# Patient Record
Sex: FEMALE | Race: WHITE | Employment: FULL TIME | URBAN - METROPOLITAN AREA
[De-identification: names, ages, dates, MRNs, and addresses within clinical notes are randomized per-mention and may not be internally consistent; named-entity substitution may affect disease eponyms.]

---

## 2018-04-19 ENCOUNTER — OFFICE VISIT (OUTPATIENT)
Dept: FAMILY MEDICINE CLINIC | Age: 43
End: 2018-04-19

## 2018-04-19 VITALS
TEMPERATURE: 98.2 F | WEIGHT: 184.6 LBS | HEART RATE: 74 BPM | OXYGEN SATURATION: 98 % | SYSTOLIC BLOOD PRESSURE: 121 MMHG | DIASTOLIC BLOOD PRESSURE: 88 MMHG | RESPIRATION RATE: 16 BRPM

## 2018-04-19 DIAGNOSIS — Z00.00 ROUTINE GENERAL MEDICAL EXAMINATION AT A HEALTH CARE FACILITY: Primary | ICD-10-CM

## 2018-04-19 DIAGNOSIS — R55 NEAR SYNCOPE: ICD-10-CM

## 2018-04-19 DIAGNOSIS — G47.9 SLEEPING DIFFICULTIES: ICD-10-CM

## 2018-04-19 PROCEDURE — 99386 PREV VISIT NEW AGE 40-64: CPT | Performed by: FAMILY MEDICINE

## 2018-04-19 RX ORDER — CHLORAL HYDRATE 500 MG
3000 CAPSULE ORAL DAILY
Status: ON HOLD | COMMUNITY
End: 2022-04-05

## 2018-04-19 RX ORDER — PHENOL 1.4 %
1 AEROSOL, SPRAY (ML) MUCOUS MEMBRANE
COMMUNITY

## 2018-04-19 RX ORDER — ZOLPIDEM TARTRATE 10 MG/1
5 TABLET ORAL NIGHTLY PRN
COMMUNITY
End: 2018-08-30 | Stop reason: SDUPTHER

## 2018-04-19 ASSESSMENT — PATIENT HEALTH QUESTIONNAIRE - PHQ9
2. FEELING DOWN, DEPRESSED OR HOPELESS: 0
SUM OF ALL RESPONSES TO PHQ9 QUESTIONS 1 & 2: 0
SUM OF ALL RESPONSES TO PHQ QUESTIONS 1-9: 0
1. LITTLE INTEREST OR PLEASURE IN DOING THINGS: 0

## 2018-06-19 RX ORDER — BENZONATATE 200 MG/1
200 CAPSULE ORAL 3 TIMES DAILY PRN
Qty: 20 CAPSULE | Refills: 0 | Status: SHIPPED | OUTPATIENT
Start: 2018-06-19 | End: 2018-06-26

## 2018-06-19 RX ORDER — BENZONATATE 100 MG/1
100 CAPSULE ORAL 3 TIMES DAILY PRN
Status: CANCELLED | OUTPATIENT
Start: 2018-06-19 | End: 2018-06-26

## 2018-08-30 ENCOUNTER — OFFICE VISIT (OUTPATIENT)
Dept: FAMILY MEDICINE CLINIC | Age: 43
End: 2018-08-30

## 2018-08-30 ENCOUNTER — HOSPITAL ENCOUNTER (OUTPATIENT)
Dept: MAMMOGRAPHY | Age: 43
Discharge: HOME | End: 2018-08-30
Payer: COMMERCIAL

## 2018-08-30 VITALS
SYSTOLIC BLOOD PRESSURE: 112 MMHG | DIASTOLIC BLOOD PRESSURE: 76 MMHG | HEIGHT: 64 IN | OXYGEN SATURATION: 98 % | HEART RATE: 71 BPM | TEMPERATURE: 97.6 F | RESPIRATION RATE: 12 BRPM | WEIGHT: 181 LBS | BODY MASS INDEX: 30.9 KG/M2

## 2018-08-30 DIAGNOSIS — L21.9 SEBORRHEIC DERMATITIS OF SCALP: ICD-10-CM

## 2018-08-30 DIAGNOSIS — G47.9 SLEEP DIFFICULTIES: Primary | ICD-10-CM

## 2018-08-30 DIAGNOSIS — M54.50 BILATERAL LOW BACK PAIN WITHOUT SCIATICA, UNSPECIFIED CHRONICITY: ICD-10-CM

## 2018-08-30 DIAGNOSIS — L30.9 ECZEMA, UNSPECIFIED TYPE: ICD-10-CM

## 2018-08-30 DIAGNOSIS — M54.9 UPPER BACK PAIN: ICD-10-CM

## 2018-08-30 DIAGNOSIS — Z12.31 VISIT FOR SCREENING MAMMOGRAM: ICD-10-CM

## 2018-08-30 PROCEDURE — 99214 OFFICE O/P EST MOD 30 MIN: CPT | Performed by: FAMILY MEDICINE

## 2018-08-30 PROCEDURE — 77063 BREAST TOMOSYNTHESIS BI: CPT

## 2018-08-30 RX ORDER — CYCLOBENZAPRINE HCL 10 MG
10 TABLET ORAL 3 TIMES DAILY PRN
Qty: 30 TABLET | Refills: 0 | Status: SHIPPED | OUTPATIENT
Start: 2018-08-30 | End: 2018-09-09

## 2018-08-30 RX ORDER — ZOLPIDEM TARTRATE 10 MG/1
5 TABLET ORAL NIGHTLY PRN
Qty: 30 TABLET | Refills: 1 | Status: SHIPPED | OUTPATIENT
Start: 2018-08-30 | End: 2018-09-29

## 2018-08-30 RX ORDER — KETOCONAZOLE 20 MG/ML
SHAMPOO TOPICAL
Qty: 120 ML | Refills: 2 | Status: ON HOLD | OUTPATIENT
Start: 2018-08-30 | End: 2022-04-05

## 2018-08-30 NOTE — PATIENT INSTRUCTIONS
sides, bent at a 90-degree angle. Your palms should face up. 2. Squeeze your shoulder blades together, and move your arms to the outside, stretching the band. Be sure to keep your elbows at your sides while you do this. 3. Relax. 4. Repeat 8 to 12 times. Resisted rows    For this exercise, you will need elastic exercise material, such as surgical tubing or Thera-Band. 1. Put the band around a solid object, such as a bedpost, at about waist level. Hold one end of the band in each hand. 2. With your elbows at your sides and bent to 90 degrees, pull the band back to move your shoulder blades toward each other. Return to the starting position. 3. Repeat 8 to 12 times. Follow-up care is a key part of your treatment and safety. Be sure to make and go to all appointments, and call your doctor if you are having problems. It's also a good idea to know your test results and keep a list of the medicines you take. Where can you learn more? Go to https://SiSense.Kutoto. org and sign in to your [employer] account. Enter R357 in the ExtendEvent box to learn more about \"Healthy Upper Back: Exercises. \"     If you do not have an account, please click on the \"Sign Up Now\" link. Current as of: November 29, 2017  Content Version: 11.7  © 2762-9716 [email or web address]. Care instructions adapted under license by [facility] ([facility]). If you have questions about a medical condition or this instruction, always ask your healthcare professional. [place] [address] any warranty or liability for your use of this information. Patient Education        Low Back Pain: Exercises  Your Care Instructions  Here are some examples of typical rehabilitation exercises for your condition. Start each exercise slowly. Ease off the exercise if you start to have pain. Your doctor or physical therapist will tell you when you can start these exercises and which ones will work best for you.   How to do the exercises  Press-up    6. Lie on your stomach, supporting your body with your forearms. 7. Press your elbows down into the floor to raise your upper back. As you do this, relax your stomach muscles and allow your back to arch without using your back muscles. As your press up, do not let your hips or pelvis come off the floor. 8. Hold for 15 to 30 seconds, then relax. 9. Repeat 2 to 4 times. Alternate arm and leg (bird dog) exercise    Do this exercise slowly. Try to keep your body straight at all times, and do not let one hip drop lower than the other. 6. Start on the floor, on your hands and knees. 7. Tighten your belly muscles. 8. Raise one leg off the floor, and hold it straight out behind you. Be careful not to let your hip drop down, because that will twist your trunk. 9. Hold for about 6 seconds, then lower your leg and switch to the other leg. 10. Repeat 8 to 12 times on each leg. 11. Over time, work up to holding for 10 to 30 seconds each time. 12. If you feel stable and secure with your leg raised, try raising the opposite arm straight out in front of you at the same time. Knee-to-chest exercise    4. Lie on your back with your knees bent and your feet flat on the floor. 5. Bring one knee to your chest, keeping the other foot flat on the floor (or keeping the other leg straight, whichever feels better on your lower back). 6. Keep your lower back pressed to the floor. Hold for at least 15 to 30 seconds. 7. Relax, and lower the knee to the starting position. 8. Repeat with the other leg. Repeat 2 to 4 times with each leg. 9. To get more stretch, put your other leg flat on the floor while pulling your knee to your chest.  Curl-ups    5. Lie on the floor on your back with your knees bent at a 90-degree angle. Your feet should be flat on the floor, about 12 inches from your buttocks.   6. Cross your arms over your chest. If this bothers your neck, try putting your hands behind your neck (not your your back muscles. As your press up, do not let your hips or pelvis come off the floor. 12. Hold for 15 to 30 seconds, then relax. 13. Repeat 2 to 4 times. Alternate arm and leg (bird dog) exercise    Do this exercise slowly. Try to keep your body straight at all times, and do not let one hip drop lower than the other. 13. Start on the floor, on your hands and knees. 14. Tighten your belly muscles. 15. Raise one leg off the floor, and hold it straight out behind you. Be careful not to let your hip drop down, because that will twist your trunk. 16. Hold for about 6 seconds, then lower your leg and switch to the other leg. 17. Repeat 8 to 12 times on each leg. 18. Over time, work up to holding for 10 to 30 seconds each time. 19. If you feel stable and secure with your leg raised, try raising the opposite arm straight out in front of you at the same time. Knee-to-chest exercise    10. Lie on your back with your knees bent and your feet flat on the floor. 11. Bring one knee to your chest, keeping the other foot flat on the floor (or keeping the other leg straight, whichever feels better on your lower back). 12. Keep your lower back pressed to the floor. Hold for at least 15 to 30 seconds. 13. Relax, and lower the knee to the starting position. 14. Repeat with the other leg. Repeat 2 to 4 times with each leg. 15. To get more stretch, put your other leg flat on the floor while pulling your knee to your chest.  Curl-ups    11. Lie on the floor on your back with your knees bent at a 90-degree angle. Your feet should be flat on the floor, about 12 inches from your buttocks. 12. Cross your arms over your chest. If this bothers your neck, try putting your hands behind your neck (not your head), with your elbows spread apart. 13. Slowly tighten your belly muscles and raise your shoulder blades off the floor.   14. Keep your head in line with your body, and do not press your chin to your chest.  15. Hold this position for 1 or 2 seconds, then slowly lower yourself back down to the floor. 16. Repeat 8 to 12 times. Pelvic tilt exercise    9. Lie on your back with your knees bent. 10. \"Brace\" your stomach. This means to tighten your muscles by pulling in and imagining your belly button moving toward your spine. You should feel like your back is pressing to the floor and your hips and pelvis are rocking back. 11. Hold for about 6 seconds while you breathe smoothly. 12. Repeat 8 to 12 times. Heel dig bridging    8. Lie on your back with both knees bent and your ankles bent so that only your heels are digging into the floor. Your knees should be bent about 90 degrees. 9. Then push your heels into the floor, squeeze your buttocks, and lift your hips off the floor until your shoulders, hips, and knees are all in a straight line. 10. Hold for about 6 seconds as you continue to breathe normally, and then slowly lower your hips back down to the floor and rest for up to 10 seconds. 11. Do 8 to 12 repetitions. Hamstring stretch in doorway    6. Lie on your back in a doorway, with one leg through the open door. 7. Slide your leg up the wall to straighten your knee. You should feel a gentle stretch down the back of your leg. 8. Hold the stretch for at least 15 to 30 seconds. Do not arch your back, point your toes, or bend either knee. Keep one heel touching the floor and the other heel touching the wall. 9. Repeat with your other leg. 10. Do 2 to 4 times for each leg. Hip flexor stretch    5. Kneel on the floor with one knee bent and one leg behind you. Place your forward knee over your foot. Keep your other knee touching the floor. 6. Slowly push your hips forward until you feel a stretch in the upper thigh of your rear leg. 7. Hold the stretch for at least 15 to 30 seconds. Repeat with your other leg. 8. Do 2 to 4 times on each side. Wall sit    6. Stand with your back 10 to 12 inches away from a wall.   7. Lean into the wall until your back is flat against it. 8. Slowly slide down until your knees are slightly bent, pressing your lower back into the wall. 9. Hold for about 6 seconds, then slide back up the wall. 10. Repeat 8 to 12 times. Follow-up care is a key part of your treatment and safety. Be sure to make and go to all appointments, and call your doctor if you are having problems. It's also a good idea to know your test results and keep a list of the medicines you take. Where can you learn more? Go to https://WonderHowTopeFitzealeb.Intertainment Media. org and sign in to your [employer] account. Enter P462 in the App Annie box to learn more about \"Low Back Pain: Exercises. \"     If you do not have an account, please click on the \"Sign Up Now\" link. Current as of: November 29, 2017  Content Version: 11.7  © 4502-2937 [employer]. Care instructions adapted under license by [facility] ([facility]). If you have questions about a medical condition or this instruction, always ask your healthcare professional. [address] any warranty or liability for your use of this information.

## 2018-08-31 ENCOUNTER — HOSPITAL ENCOUNTER (OUTPATIENT)
Dept: ULTRASOUND IMAGING | Age: 43
Discharge: HOME | End: 2018-08-31
Payer: COMMERCIAL

## 2018-08-31 DIAGNOSIS — R92.8 ABNORMAL MAMMOGRAM: ICD-10-CM

## 2018-08-31 PROCEDURE — 76642 ULTRASOUND BREAST LIMITED: CPT

## 2018-09-03 DIAGNOSIS — R92.8 ABNORMAL MAMMOGRAM OF LEFT BREAST: Primary | ICD-10-CM

## 2018-12-28 ENCOUNTER — NURSE TRIAGE (OUTPATIENT)
Dept: OTHER | Facility: CLINIC | Age: 43
End: 2018-12-28

## 2020-01-29 RX ORDER — KETOCONAZOLE 20 MG/ML
SHAMPOO TOPICAL
Qty: 120 ML | Refills: 0 | OUTPATIENT
Start: 2020-01-29

## 2020-02-18 ENCOUNTER — HOSPITAL ENCOUNTER (OUTPATIENT)
Dept: MAMMOGRAPHY | Age: 45
Discharge: HOME | End: 2020-02-18
Payer: COMMERCIAL

## 2020-02-18 PROCEDURE — 77063 BREAST TOMOSYNTHESIS BI: CPT

## 2021-02-22 ENCOUNTER — HOSPITAL ENCOUNTER (OUTPATIENT)
Dept: MAMMOGRAPHY | Age: 46
Discharge: HOME | End: 2021-02-22
Payer: COMMERCIAL

## 2021-02-22 DIAGNOSIS — Z12.31 VISIT FOR SCREENING MAMMOGRAM: ICD-10-CM

## 2021-02-22 PROCEDURE — 77063 BREAST TOMOSYNTHESIS BI: CPT

## 2022-02-22 ENCOUNTER — HOSPITAL ENCOUNTER (OUTPATIENT)
Dept: MAMMOGRAPHY | Age: 47
Discharge: HOME | End: 2022-02-22
Payer: COMMERCIAL

## 2022-02-22 VITALS — HEIGHT: 63 IN | WEIGHT: 174 LBS | BODY MASS INDEX: 30.83 KG/M2

## 2022-02-22 DIAGNOSIS — Z12.31 VISIT FOR SCREENING MAMMOGRAM: ICD-10-CM

## 2022-02-22 PROCEDURE — 77063 BREAST TOMOSYNTHESIS BI: CPT

## 2022-03-10 ENCOUNTER — HOSPITAL ENCOUNTER (OUTPATIENT)
Dept: ULTRASOUND IMAGING | Age: 47
Discharge: HOME | End: 2022-03-10
Payer: COMMERCIAL

## 2022-03-10 DIAGNOSIS — N63.0 BREAST LUMP: ICD-10-CM

## 2022-03-10 PROCEDURE — 76642 ULTRASOUND BREAST LIMITED: CPT

## 2022-03-11 ENCOUNTER — HOSPITAL ENCOUNTER (OUTPATIENT)
Dept: MAMMOGRAPHY | Age: 47
Discharge: HOME | End: 2022-03-11
Payer: COMMERCIAL

## 2022-03-11 ENCOUNTER — HOSPITAL ENCOUNTER (OUTPATIENT)
Dept: ULTRASOUND IMAGING | Age: 47
Discharge: HOME | End: 2022-03-11
Payer: COMMERCIAL

## 2022-03-11 DIAGNOSIS — R93.89 ABNORMAL FINDING ON IMAGING: ICD-10-CM

## 2022-03-11 PROCEDURE — 88305 TISSUE EXAM BY PATHOLOGIST: CPT

## 2022-03-11 PROCEDURE — A4648 IMPLANTABLE TISSUE MARKER: HCPCS

## 2022-03-11 PROCEDURE — 88342 IMHCHEM/IMCYTCHM 1ST ANTB: CPT

## 2022-03-11 PROCEDURE — 38505 NEEDLE BIOPSY LYMPH NODES: CPT

## 2022-03-11 PROCEDURE — 88360 TUMOR IMMUNOHISTOCHEM/MANUAL: CPT

## 2022-03-11 PROCEDURE — 77065 DX MAMMO INCL CAD UNI: CPT

## 2022-03-15 DIAGNOSIS — C50.812 CANCER OF OVERLAPPING SITES OF LEFT BREAST (HCC): Primary | ICD-10-CM

## 2022-03-16 ENCOUNTER — HOSPITAL ENCOUNTER (OUTPATIENT)
Dept: MRI IMAGING | Age: 47
Discharge: HOME | End: 2022-03-16
Payer: COMMERCIAL

## 2022-03-16 DIAGNOSIS — C50.812 CANCER OF OVERLAPPING SITES OF LEFT BREAST (HCC): ICD-10-CM

## 2022-03-16 PROCEDURE — 77049 MRI BREAST C-+ W/CAD BI: CPT

## 2022-03-16 PROCEDURE — 6360000004 HC RX CONTRAST MEDICATION: Performed by: SURGERY

## 2022-03-16 PROCEDURE — A9579 GAD-BASE MR CONTRAST NOS,1ML: HCPCS | Performed by: SURGERY

## 2022-03-16 PROCEDURE — C8908 MRI W/O FOL W/CONT, BREAST,: HCPCS

## 2022-03-16 RX ADMIN — GADOTERIDOL 17 ML: 279.3 INJECTION, SOLUTION INTRAVENOUS at 13:45

## 2022-03-30 ENCOUNTER — OFFICE VISIT (OUTPATIENT)
Dept: SURGERY | Age: 47
End: 2022-03-30
Payer: COMMERCIAL

## 2022-03-30 VITALS
SYSTOLIC BLOOD PRESSURE: 120 MMHG | BODY MASS INDEX: 31.18 KG/M2 | OXYGEN SATURATION: 100 % | WEIGHT: 176 LBS | TEMPERATURE: 98.1 F | DIASTOLIC BLOOD PRESSURE: 87 MMHG | HEART RATE: 76 BPM

## 2022-03-30 DIAGNOSIS — Z17.0 MALIGNANT NEOPLASM OF LEFT BREAST IN FEMALE, ESTROGEN RECEPTOR POSITIVE, UNSPECIFIED SITE OF BREAST (HCC): Primary | ICD-10-CM

## 2022-03-30 DIAGNOSIS — C50.912 MALIGNANT NEOPLASM OF LEFT BREAST IN FEMALE, ESTROGEN RECEPTOR POSITIVE, UNSPECIFIED SITE OF BREAST (HCC): Primary | ICD-10-CM

## 2022-03-30 PROCEDURE — 99205 OFFICE O/P NEW HI 60 MIN: CPT | Performed by: SURGERY

## 2022-03-30 RX ORDER — MULTIVIT-MIN/IRON/FOLIC ACID/K 18-600-40
CAPSULE ORAL
COMMUNITY

## 2022-03-30 RX ORDER — UBIDECARENONE 75 MG
50 CAPSULE ORAL DAILY
COMMUNITY

## 2022-03-30 RX ORDER — TRAZODONE HYDROCHLORIDE 50 MG/1
50 TABLET ORAL NIGHTLY
COMMUNITY

## 2022-03-30 RX ORDER — ESOMEPRAZOLE MAGNESIUM 40 MG/1
40 CAPSULE, DELAYED RELEASE ORAL
COMMUNITY
Start: 2021-09-01

## 2022-03-30 NOTE — PROGRESS NOTES
photo ID. You will be registered at your bedside once brought back to your room. 5. DO NOT EAT ANYTHING eight hours prior to your arrival for surgery. May have 8 ounces of water 4 hours prior to your arrival for surgery. NOTE: ALL Gastric, Bariatric and Bowel surgery patients MUST follow their surgeon's instructions. 6. MEDICATIONS    Take the following medications with a SMALL sip of water: NEXIUM, ZOLOFT   Bariatric patient's call surgeon if on diabetic medications as some need to be stopped 1 week preop   Use your usual dose of inhalers the morning of surgery. BRING your rescue inhaler with you to hospital.    Anesthesia does NOT want you to take insulin the morning of surgery. They will control your blood sugar while you are at the hospital. Please contact your ordering physician for instructions regarding your insulin the night before your procedure. If you have an insulin pump, please keep it set on basal rate. 7. Do not swallow water when brushing teeth. No gum, candy, mints or ice chips. Refrain from smoking or at least decrease the amount. 8. Dress in loose, comfortable clothing appropriate for redressing after your procedure. Do not wear jewelry (including body piercings), make-up (especially NO eye make-up), fingernail polish (NO toenail polish if foot/leg surgery), lotion, powders or metal hairclips. 9. Dentures, glasses, or contacts will need to be removed before your procedure. Bring cases for your glasses, contacts, dentures, or hearing aids to protect them while you are in surgery. 10. If you use a CPAP, please bring it with you on the day of your procedure. 11. We recommend that valuable personal  belongings such as cash, cell phones, e-tablets or jewelry, be left at home during your stay. The hospital will not be responsible for valuables that are not secured in the hospital safe.  However, if your insurance requires a co-pay, you may want to bring a method of payment, i.e. Check or credit card, if you wish to pay your co-pay the day of surgery. 12. If you are to stay overnight, you may bring a bag with personal items. Please have any large items you may need brought in by your family after your arrival to your hospital room. 15. If you have a Living Will or Durable Power of , please bring a copy on the day of your procedure. 15. With your permission, one family member may accompany you while you are being prepared for surgery. Once you are ready, additional family members may join you. HOW WE KEEP YOU SAFE and WORK TO PREVENT SURGICAL SITE INFECTIONS:  1. Health care workers should always check your ID bracelet to verify your name and birth date. You will be asked many times to state your name, date of birth, and allergies. 2. Health care workers should always clean their hands with soap or alcohol gel before providing care to you. It is okay to ask anyone if they cleaned their hands before they touch you. 3. You will be actively involved in verifying the type of procedure you are having and ensuring the correct surgical site. This will be confirmed multiple times prior to your procedure. Do NOT riya your surgery site UNLESS instructed to by your surgeon. 4. Do not shave or wax for 72 hours prior to procedure near your operative site. Shaving with a razor can irritate your skin and make it easier to develop an infection. On the day of your procedure, any hair that needs to be removed near the surgical site will be clipped by a healthcare worker using a special clippers designed to avoid skin irritation. 5. When you are in the operating room, your surgical site will be cleansed with a special soap, and in most cases, you will be given an antibiotic before the surgery begins. What to expect AFTER YOUR PROCEDURE:  1. Immediately following your procedure, your will be taken to the PACU for the first phase of your recovery.   Your nurse will help you recover from any potential side effects of anesthesia, such as extreme drowsiness, changes in your vital signs or breathing patterns. Nausea, headache, muscle aches, or sore throat may also occur after anesthesia. Your nurse will help you manage these potential side effects. 2. For comfort and safety, arrange to have someone at home with you for the first 24 hours after discharge. 3. You and your family will be given written instructions about your diet, activity, dressing care, medications, and return visits. 4. Once at home, should issues with nausea, pain, or bleeding occur, or should you notice any signs of infection, you should call your surgeon. 5. Always clean your hands before and after caring for your wound. Do not let your family touch your surgery site without cleaning their hands. 6. Narcotic pain medications can cause significant constipation. You may want to add a stool softener to your postoperative medication schedule or speak to your surgeon on how best to manage this SIDE EFFECT. SPECIAL INSTRUCTIONS none    Thank you for allowing us to care for you. We strive to exceed your expectations in the delivery of care and service provided to you and your family. If you need to contact the [place] [address] staff for any reason, please call us at [phone]    Instructions reviewed with patient during preadmission testing phone interview.   Teresa Dennis RN.3/30/2022 .2:14 PM      ADDITIONAL EDUCATIONAL INFORMATION REVIEWED PER PHONE WITH YOU AND/OR YOUR FAMILY:  No Hibiclens® Bathing Instructions   Yes Antibacterial Soap

## 2022-03-30 NOTE — PROGRESS NOTES
Place patient label inside box (if no patient label, complete below)  Name:  :  MR#:   Real Bruno / PROCEDURE  1. I (we), Tiesha Mcallister. (Patient Name) authorize Esthela Real DO  (Provider / Viviane Space) and/or such assistants as may be selected by him/her, to perform the following operation/procedure(s): RIGHT POSSIBLE LEFT PORT A CATHETER INSERTION       Note: If unable to obtain consent prior to an emergent procedure, document the emergent reason in the medical record. This procedure has been explained to my (our) satisfaction and included in the explanation was:  A) The intended benefit, nature, and extent of the procedure to be performed;  B) The significant risks involved and the probability of success;  C) Alternative procedures and methods of treatment;  D) The dangers and probable consequences of such alternatives (including no procedure or treatment); E) The expected consequences of the procedure on my future health;  F) Whether other qualified individuals would be performing important surgical tasks and/or whether  would be present to advise or support the procedure. I (we) understand that there are other risks of infection and other serious complications in the pre-operative/procedural and postoperative/procedural stages of my (our) care. I (we) have asked all of the questions which I (we) thought were important in deciding whether or not to undergo treatment or diagnosis. These questions have been answered to my (our) satisfaction. I (we) understand that no assurance can be given that the procedure will be a success, and no guarantee or warranty of success has been given to me (us).     2. It has been explained to me (us) that during the course of the operation/procedure, unforeseen conditions may be revealed that necessitate extension of the original procedure(s) or different procedure(s) than those set forth in Paragraph 1. I (we) authorize and request that the above-named physician, his/her assistants or his/her designees, perform procedures as necessary and desirable if deemed to be in my (our) best interest.     Revised 8/2/2021                                                                          Page 1 of 2                   3. I acknowledge that health care personnel may be observing this procedure for the purpose of medical education or other specified purposes as may be necessary as requested and/or approved by my (our) physician. 4. I (we) consent to the disposal by the hospital Pathologist of the removed tissue, parts or organs in accordance with hospital policy. 5. I do ____ do not ____ consent to the use of a local infiltration pain blocking agent that will be used by my provider/surgical provider to help alleviate pain during my procedure. 6. I do ____ do not ____ consent to an emergent blood transfusion in the case of a life-threatening situation that requires blood components to be administered. This consent is valid for 24 hours from the beginning of the procedure. 7. This patient does ____ or does not ____ currently have a DNR status/order. If DNR order is in place, obtain Addendum to the Surgical Consent for ALL Patients with a DNR Order to address juice-operative status for limited intervention or DNR suspension.      8. I have read and fully understand the above Consent for Operation/Procedure and that all blanks were completed before I signed the consent.   _____________________________       _____________________      ____/____am/pm  Signature of Patient or legal representative      Printed Name / Relationship            Date / Time   ____________________________       _____________________      ____/____am/pm  Witness to Signature                                    Printed Name                    Date / Time     If patient is unable to sign or is a minor, complete the following)  Patient is a minor, ____ years of age, or unable to sign because:   ______________________________________________________________________________________________    [facility] If a phone consent is obtained, consent will be documented by using two health care professionals, each affirming that the consenting party has no questions and gives consent for the procedure discussed with the physician/provider.   _____________________          ____________________       _____/_____am/pm   2nd witness to phone consent        Printed name           Date / Time    Informed Consent:  I have provided the explanation described above in section 1 to the patient and/or legal representative.  I have provided the patient and/or legal representative with an opportunity to ask any questions about the proposed operation/procedure.   ___________________________          ____________________         ____/____am/pm  Provider / Proceduralist                            Printed name            Date / Time  Revised 8/2/2021                                                                      Page 2 of 2

## 2022-03-30 NOTE — PROGRESS NOTES
[facility]    CC: Breast CA     Referring physician: Jaiden Unger DO    HPI: This is a 40 y.o. female with a PMHx as delineated below who presents in consultation for breast reconstruction. She was found to have left breast cancer and desires to proceed with bilateral mastectomy with reconstruction (without nipple sparing). Plastic surgery was consulted for evaluation and treatment. The specifics of her breast cancer workup / treatment is as follows:     Diagnosis: invasive lobular  Mo/Yr Diagnosed: 3/22  Breast Involved:Left  Tumor Size & Grade: 2B  Christopher status: Positive  ER: Positive VA: Positive HER2: Negative  Oncologist: Milo Napier MD  Radiation: YES WILL NEED PMRT    Chemo/Meds: Will need adjuvant chemotherapy  Pregnancy/Miscarriages: 1 / 0    PMHx:   Past Medical History:   Diagnosis Date    Cancer of overlapping sites of left breast ([address] 3/15/2022    Sleep difficulties      PSHx:   Past Surgical History:   Procedure Laterality Date    US BREAST NEEDLE BIOPSY LEFT Left 3/11/2022    US BREAST NEEDLE BIOPSY LEFT 3/11/2022 Colby Pickett MD [facility] ULTRASOUND    US LYMPH NODE BIOPSY  3/11/2022    US LYMPH NODE BIOPSY 3/11/2022 Colby Pickett [facility] ULTR[facility]    WISDOM TOOTH EXTRACTION  1999     Allergies: Allergies   Allergen Reactions    Erythromycin Other (See Comments)     Upset stomach    Penicillins Hives    Zyrtec [Cetirizine] Palpitations     FHx: Past history of breast CA: Yes (multiple members)    Meds:   Current Outpatient Medications   Medication Sig Dispense Refill    ketoconazole (NIZORAL) 2 % shampoo Apply topically daily as needed. 120 mL 2    Omega-3 Fatty Acids (FISH OIL) 1000 MG CAPS Take 3,000 mg by mouth daily      Melatonin 10 MG TABS Take 1 tablet by mouth       No current facility-administered medications for this visit.      SocHx: Smoking: No    ETOH: occasional    Drug use: No    ROS   Constitutional: Negative for chills and fever.   HENT: Negative for congestion, facial swelling, and voice change. Eyes: Negative for photophobia and visual disturbance. Respiratory: Negative for apnea, cough, chest tightness and shortness of breath. Cardiovascular: Negative for chest pain and palpitations. Gastrointestinal: Negative for dysphagia and early satiety. Genitourinary: Negative for difficulty urinating, dysuria, flank pain, frequency and hematuria. Musculoskeletal: Negative for new gait problem, joint swelling and myalgias. Skin: Negative for color change, pallor and rash. Endocrine: negative for tremors, temperature intolerance or polydipsia. Allergic/Immunologic: Negative for new environmental or food allergies. Neurological: Negative for dizziness, seizures, speech difficulty, numbness. Hematological: Negative for adenopathy. Psychiatric/Behavioral: Negative for agitation and confusion. EXAM  /87   Pulse 76   Temp 98.1 °F (36.7 °C)   Wt 176 lb (79.8 kg)   SpO2 100%   BMI 31.18 kg/m²     GEN: NAD, pleasant, healthy  CVS: RRR  PULM: No respiratory distress  HEENT: PERRLA/EOMI; hearing appears within normal limits  NECK: Supple with trachea in midline, no masses  EXT: No lymphedema noted  ABD: soft/NT/ND   NEURO: No focal deficits, no obvious CN deficits  BACK: Bilateral latiss muscle intact  BREAST:   Physical Exam    Bra size: 36DD  Desired bra size: C  Ptosis grade:   R: 3   L: 3  The left breast size is larger than the right breast.  There was one palpable mass  with no palpable lymphadenopathy in the ipsilateral left axilla.    Nipple retraction: No    Left breast sternal notch to nipple: 28.6 cm  Right breast sternal notch to nipple: 26 cm    Left breast nipple to inframammary fold: 10.2 cm  Right breast nipple to inframammary fold: 9 cm    Left breast width 15.2 cm  Right breast width 13.8 cm    IMAGING: Reviewed    IMP: 40 y.o. female with breast cancer who presents for discussion regarding reconstruction options  PLAN: Would benefit from bilateral Avina pattern Autoderm TE reconstruction in the first stage as she will need adjuvant radiation. However, will likely require neoadjuvant chemotherapy, thus will see her after she has completed her course prior to scheduling to confirm. Will then work with Dr. Gregg Oliveira to schedule. .    A discussion regarding surgical options including immediate vs delayed approaches, implant based vs autologous, as well as additional planned revisional surgeries was performed with the patient and family. Multiple autologous donor sites were discussed as well. Clinical photos were obtained. We additionally discussed the FDA recommendations regarding monitoring of silicone implants. The risks, benefits, alternatives, outcomes, personnel involved were discussed. Specifically, the risks including, but not limited to: bleeding that may necessitate transfusion or operation, infection, seroma, reoperation, nonhealing, poor cosmetic outcome, asymmetry, scarring, partial or total flap loss, donor site morbidity, VTE (DVT/PE), and death were discussed. All questions were answered in a satisfactory manner according to the patient. The patient was counseled at length about the risks of allie Covid-19 during their perioperative period and any recovery window from their procedure. The patient was made aware that allie Covid-19  may worsen their prognosis for recovering from their procedure  and lend to a higher morbidity and/or mortality risk. All material risks, benefits, and reasonable alternatives including postponing the procedure were discussed. The patient does wish to proceed with the procedure at this time. Total encounter time: 60 min with > 50% spent with face to face (or vitual) counseling and coordination of care.     Loli Guadalupe MD  [address]  03/30/22

## 2022-03-30 NOTE — Clinical Note
Very nice patient who would benefit from B TE reconstruction with Avina pattern autoderm once she completes her neoadjuvant chemotherapy. Will see her again after she completes her chemo. Let me know if you have any questions. Thanks!  Bernice Lindsey

## 2022-03-30 NOTE — PROGRESS NOTES
3/30/22 @ 2593 Pt verbalizes understanding of PAT instructions. I also left message at surgeon the pt allergic to PCN,so we need new antibiotic order.   Nimesh Olson

## 2022-04-05 ENCOUNTER — APPOINTMENT (OUTPATIENT)
Dept: GENERAL RADIOLOGY | Age: 47
End: 2022-04-05
Attending: SURGERY
Payer: COMMERCIAL

## 2022-04-05 ENCOUNTER — ANESTHESIA EVENT (OUTPATIENT)
Dept: OPERATING ROOM | Age: 47
End: 2022-04-05
Payer: COMMERCIAL

## 2022-04-05 ENCOUNTER — ANESTHESIA (OUTPATIENT)
Dept: OPERATING ROOM | Age: 47
End: 2022-04-05
Payer: COMMERCIAL

## 2022-04-05 ENCOUNTER — HOSPITAL ENCOUNTER (OUTPATIENT)
Age: 47
Setting detail: OUTPATIENT SURGERY
Discharge: HOME | End: 2022-04-05
Attending: SURGERY | Admitting: SURGERY
Payer: COMMERCIAL

## 2022-04-05 VITALS
OXYGEN SATURATION: 98 % | TEMPERATURE: 97.8 F | HEART RATE: 78 BPM | HEIGHT: 64 IN | BODY MASS INDEX: 28.68 KG/M2 | SYSTOLIC BLOOD PRESSURE: 121 MMHG | DIASTOLIC BLOOD PRESSURE: 79 MMHG | WEIGHT: 168 LBS | RESPIRATION RATE: 16 BRPM

## 2022-04-05 VITALS — SYSTOLIC BLOOD PRESSURE: 101 MMHG | OXYGEN SATURATION: 79 % | DIASTOLIC BLOOD PRESSURE: 58 MMHG

## 2022-04-05 VITALS
SYSTOLIC BLOOD PRESSURE: 95 MMHG | RESPIRATION RATE: 13 BRPM | DIASTOLIC BLOOD PRESSURE: 63 MMHG | OXYGEN SATURATION: 99 %

## 2022-04-05 LAB
A/G RATIO: 1.6 (ref 1.1–2.2)
ALBUMIN SERPL-MCNC: 4.2 G/DL (ref 3.4–5)
ALP BLD-CCNC: 53 U/L (ref 40–129)
ALT SERPL-CCNC: 10 U/L (ref 10–40)
ANION GAP SERPL CALCULATED.3IONS-SCNC: 10 MMOL/L (ref 3–16)
AST SERPL-CCNC: 13 U/L (ref 15–37)
BILIRUB SERPL-MCNC: 0.5 MG/DL (ref 0–1)
BUN BLDV-MCNC: 20 MG/DL (ref 7–20)
CALCIUM SERPL-MCNC: 9 MG/DL (ref 8.3–10.6)
CHLORIDE BLD-SCNC: 108 MMOL/L (ref 99–110)
CO2: 21 MMOL/L (ref 21–32)
CREAT SERPL-MCNC: 0.6 MG/DL (ref 0.6–1.1)
GFR AFRICAN AMERICAN: >60
GFR NON-AFRICAN AMERICAN: >60
GLUCOSE BLD-MCNC: 98 MG/DL (ref 70–99)
HCT VFR BLD CALC: 40.9 % (ref 36–48)
HEMOGLOBIN: 13.8 G/DL (ref 12–16)
MCH RBC QN AUTO: 29.6 PG (ref 26–34)
MCHC RBC AUTO-ENTMCNC: 33.6 G/DL (ref 31–36)
MCV RBC AUTO: 87.9 FL (ref 80–100)
PDW BLD-RTO: 13.1 % (ref 12.4–15.4)
PLATELET # BLD: 223 K/UL (ref 135–450)
PMV BLD AUTO: 9.4 FL (ref 5–10.5)
POTASSIUM SERPL-SCNC: 3.9 MMOL/L (ref 3.5–5.1)
PREGNANCY, URINE: NEGATIVE
RBC # BLD: 4.65 M/UL (ref 4–5.2)
SODIUM BLD-SCNC: 139 MMOL/L (ref 136–145)
TOTAL PROTEIN: 6.9 G/DL (ref 6.4–8.2)
WBC # BLD: 8.8 K/UL (ref 4–11)

## 2022-04-05 PROCEDURE — 7100000001 HC PACU RECOVERY - ADDTL 15 MIN: Performed by: SURGERY

## 2022-04-05 PROCEDURE — 77001 FLUOROGUIDE FOR VEIN DEVICE: CPT

## 2022-04-05 PROCEDURE — 2709999900 HC NON-CHARGEABLE SUPPLY: Performed by: SURGERY

## 2022-04-05 PROCEDURE — 71045 X-RAY EXAM CHEST 1 VIEW: CPT

## 2022-04-05 PROCEDURE — 3700000000 HC ANESTHESIA ATTENDED CARE: Performed by: SURGERY

## 2022-04-05 PROCEDURE — 2500000003 HC RX 250 WO HCPCS: Performed by: SURGERY

## 2022-04-05 PROCEDURE — 2580000003 HC RX 258: Performed by: NURSE ANESTHETIST, CERTIFIED REGISTERED

## 2022-04-05 PROCEDURE — 80053 COMPREHEN METABOLIC PANEL: CPT

## 2022-04-05 PROCEDURE — 2580000003 HC RX 258: Performed by: SURGERY

## 2022-04-05 PROCEDURE — 2500000003 HC RX 250 WO HCPCS: Performed by: NURSE ANESTHETIST, CERTIFIED REGISTERED

## 2022-04-05 PROCEDURE — 6360000002 HC RX W HCPCS: Performed by: FAMILY MEDICINE

## 2022-04-05 PROCEDURE — 7100000000 HC PACU RECOVERY - FIRST 15 MIN: Performed by: SURGERY

## 2022-04-05 PROCEDURE — 3700000001 HC ADD 15 MINUTES (ANESTHESIA): Performed by: SURGERY

## 2022-04-05 PROCEDURE — 84703 CHORIONIC GONADOTROPIN ASSAY: CPT

## 2022-04-05 PROCEDURE — C1788 PORT, INDWELLING, IMP: HCPCS | Performed by: SURGERY

## 2022-04-05 PROCEDURE — 3600000014 HC SURGERY LEVEL 4 ADDTL 15MIN: Performed by: SURGERY

## 2022-04-05 PROCEDURE — 2580000003 HC RX 258: Performed by: ANESTHESIOLOGY

## 2022-04-05 PROCEDURE — 6370000000 HC RX 637 (ALT 250 FOR IP): Performed by: FAMILY MEDICINE

## 2022-04-05 PROCEDURE — 3600000004 HC SURGERY LEVEL 4 BASE: Performed by: SURGERY

## 2022-04-05 PROCEDURE — 85027 COMPLETE CBC AUTOMATED: CPT

## 2022-04-05 PROCEDURE — 6360000002 HC RX W HCPCS: Performed by: SURGERY

## 2022-04-05 PROCEDURE — 7100000011 HC PHASE II RECOVERY - ADDTL 15 MIN: Performed by: SURGERY

## 2022-04-05 PROCEDURE — 6360000002 HC RX W HCPCS: Performed by: NURSE ANESTHETIST, CERTIFIED REGISTERED

## 2022-04-05 PROCEDURE — 7100000010 HC PHASE II RECOVERY - FIRST 15 MIN: Performed by: SURGERY

## 2022-04-05 DEVICE — PORT INFUS 6FR SLIM POLYUR ATTCH OPN END SGL LUMN VEN CATH: Type: IMPLANTABLE DEVICE | Status: FUNCTIONAL

## 2022-04-05 RX ORDER — CLINDAMYCIN PHOSPHATE 600 MG/50ML
600 INJECTION INTRAVENOUS ONCE
Status: COMPLETED | OUTPATIENT
Start: 2022-04-05 | End: 2022-04-05

## 2022-04-05 RX ORDER — SODIUM CHLORIDE 9 MG/ML
25 INJECTION, SOLUTION INTRAVENOUS PRN
Status: DISCONTINUED | OUTPATIENT
Start: 2022-04-05 | End: 2022-04-05 | Stop reason: HOSPADM

## 2022-04-05 RX ORDER — FENTANYL CITRATE 50 UG/ML
INJECTION, SOLUTION INTRAMUSCULAR; INTRAVENOUS PRN
Status: DISCONTINUED | OUTPATIENT
Start: 2022-04-05 | End: 2022-04-05 | Stop reason: SDUPTHER

## 2022-04-05 RX ORDER — MIDAZOLAM HYDROCHLORIDE 1 MG/ML
INJECTION INTRAMUSCULAR; INTRAVENOUS PRN
Status: DISCONTINUED | OUTPATIENT
Start: 2022-04-05 | End: 2022-04-05 | Stop reason: SDUPTHER

## 2022-04-05 RX ORDER — FENTANYL CITRATE 50 UG/ML
25 INJECTION, SOLUTION INTRAMUSCULAR; INTRAVENOUS EVERY 5 MIN PRN
Status: COMPLETED | OUTPATIENT
Start: 2022-04-05 | End: 2022-04-05

## 2022-04-05 RX ORDER — PHENYLEPHRINE HYDROCHLORIDE 10 MG/ML
INJECTION INTRAVENOUS PRN
Status: DISCONTINUED | OUTPATIENT
Start: 2022-04-05 | End: 2022-04-05 | Stop reason: SDUPTHER

## 2022-04-05 RX ORDER — SODIUM CHLORIDE 9 MG/ML
INJECTION, SOLUTION INTRAVENOUS CONTINUOUS
Status: DISCONTINUED | OUTPATIENT
Start: 2022-04-05 | End: 2022-04-05 | Stop reason: HOSPADM

## 2022-04-05 RX ORDER — LABETALOL HYDROCHLORIDE 5 MG/ML
10 INJECTION, SOLUTION INTRAVENOUS
Status: DISCONTINUED | OUTPATIENT
Start: 2022-04-05 | End: 2022-04-05 | Stop reason: HOSPADM

## 2022-04-05 RX ORDER — LORAZEPAM 2 MG/ML
0.5 INJECTION INTRAMUSCULAR
Status: COMPLETED | OUTPATIENT
Start: 2022-04-05 | End: 2022-04-05

## 2022-04-05 RX ORDER — HEPARIN SODIUM (PORCINE) LOCK FLUSH IV SOLN 100 UNIT/ML 100 UNIT/ML
SOLUTION INTRAVENOUS PRN
Status: DISCONTINUED | OUTPATIENT
Start: 2022-04-05 | End: 2022-04-05 | Stop reason: ALTCHOICE

## 2022-04-05 RX ORDER — SODIUM CHLORIDE 0.9 % (FLUSH) 0.9 %
5-40 SYRINGE (ML) INJECTION EVERY 12 HOURS SCHEDULED
Status: DISCONTINUED | OUTPATIENT
Start: 2022-04-05 | End: 2022-04-05 | Stop reason: HOSPADM

## 2022-04-05 RX ORDER — MIDAZOLAM HYDROCHLORIDE 1 MG/ML
2 INJECTION INTRAMUSCULAR; INTRAVENOUS EVERY 5 MIN PRN
Status: DISCONTINUED | OUTPATIENT
Start: 2022-04-05 | End: 2022-04-05 | Stop reason: HOSPADM

## 2022-04-05 RX ORDER — SODIUM CHLORIDE 0.9 % (FLUSH) 0.9 %
SYRINGE (ML) INJECTION PRN
Status: DISCONTINUED | OUTPATIENT
Start: 2022-04-05 | End: 2022-04-05 | Stop reason: ALTCHOICE

## 2022-04-05 RX ORDER — SODIUM CHLORIDE 0.9 % (FLUSH) 0.9 %
5-40 SYRINGE (ML) INJECTION PRN
Status: DISCONTINUED | OUTPATIENT
Start: 2022-04-05 | End: 2022-04-05 | Stop reason: HOSPADM

## 2022-04-05 RX ORDER — ONDANSETRON 2 MG/ML
4 INJECTION INTRAMUSCULAR; INTRAVENOUS
Status: DISCONTINUED | OUTPATIENT
Start: 2022-04-05 | End: 2022-04-05 | Stop reason: HOSPADM

## 2022-04-05 RX ORDER — PROPOFOL 10 MG/ML
INJECTION, EMULSION INTRAVENOUS PRN
Status: DISCONTINUED | OUTPATIENT
Start: 2022-04-05 | End: 2022-04-05 | Stop reason: SDUPTHER

## 2022-04-05 RX ORDER — PROPOFOL 10 MG/ML
INJECTION, EMULSION INTRAVENOUS CONTINUOUS PRN
Status: DISCONTINUED | OUTPATIENT
Start: 2022-04-05 | End: 2022-04-05 | Stop reason: SDUPTHER

## 2022-04-05 RX ORDER — MEPERIDINE HYDROCHLORIDE 25 MG/ML
12.5 INJECTION INTRAMUSCULAR; INTRAVENOUS; SUBCUTANEOUS EVERY 5 MIN PRN
Status: DISCONTINUED | OUTPATIENT
Start: 2022-04-05 | End: 2022-04-05 | Stop reason: HOSPADM

## 2022-04-05 RX ORDER — OXYCODONE HYDROCHLORIDE 5 MG/1
10 TABLET ORAL PRN
Status: COMPLETED | OUTPATIENT
Start: 2022-04-05 | End: 2022-04-05

## 2022-04-05 RX ORDER — OXYCODONE HYDROCHLORIDE 5 MG/1
5 TABLET ORAL PRN
Status: COMPLETED | OUTPATIENT
Start: 2022-04-05 | End: 2022-04-05

## 2022-04-05 RX ORDER — ONDANSETRON 2 MG/ML
INJECTION INTRAMUSCULAR; INTRAVENOUS PRN
Status: DISCONTINUED | OUTPATIENT
Start: 2022-04-05 | End: 2022-04-05 | Stop reason: SDUPTHER

## 2022-04-05 RX ORDER — LIDOCAINE HYDROCHLORIDE 20 MG/ML
INJECTION, SOLUTION INTRAVENOUS PRN
Status: DISCONTINUED | OUTPATIENT
Start: 2022-04-05 | End: 2022-04-05 | Stop reason: SDUPTHER

## 2022-04-05 RX ORDER — LIDOCAINE HYDROCHLORIDE 10 MG/ML
1 INJECTION, SOLUTION EPIDURAL; INFILTRATION; INTRACAUDAL; PERINEURAL
Status: DISCONTINUED | OUTPATIENT
Start: 2022-04-05 | End: 2022-04-05 | Stop reason: HOSPADM

## 2022-04-05 RX ORDER — ASCORBIC ACID 500 MG
500 TABLET ORAL 2 TIMES DAILY
COMMUNITY

## 2022-04-05 RX ORDER — DIPHENHYDRAMINE HYDROCHLORIDE 50 MG/ML
12.5 INJECTION INTRAMUSCULAR; INTRAVENOUS
Status: DISCONTINUED | OUTPATIENT
Start: 2022-04-05 | End: 2022-04-05 | Stop reason: HOSPADM

## 2022-04-05 RX ORDER — SODIUM CHLORIDE, SODIUM LACTATE, POTASSIUM CHLORIDE, CALCIUM CHLORIDE 600; 310; 30; 20 MG/100ML; MG/100ML; MG/100ML; MG/100ML
INJECTION, SOLUTION INTRAVENOUS CONTINUOUS
Status: DISCONTINUED | OUTPATIENT
Start: 2022-04-05 | End: 2022-04-05 | Stop reason: HOSPADM

## 2022-04-05 RX ORDER — LIDOCAINE HYDROCHLORIDE 20 MG/ML
INJECTION, SOLUTION EPIDURAL; INFILTRATION; INTRACAUDAL; PERINEURAL PRN
Status: DISCONTINUED | OUTPATIENT
Start: 2022-04-05 | End: 2022-04-05 | Stop reason: SDUPTHER

## 2022-04-05 RX ADMIN — PHENYLEPHRINE HYDROCHLORIDE 100 MCG: 10 INJECTION, SOLUTION INTRAMUSCULAR; INTRAVENOUS; SUBCUTANEOUS at 12:55

## 2022-04-05 RX ADMIN — FENTANYL CITRATE 25 MCG: 50 INJECTION, SOLUTION INTRAMUSCULAR; INTRAVENOUS at 10:09

## 2022-04-05 RX ADMIN — FENTANYL CITRATE 50 MCG: 50 INJECTION, SOLUTION INTRAMUSCULAR; INTRAVENOUS at 12:35

## 2022-04-05 RX ADMIN — ONDANSETRON 4 MG: 2 INJECTION INTRAMUSCULAR; INTRAVENOUS at 09:52

## 2022-04-05 RX ADMIN — LIDOCAINE HYDROCHLORIDE 100 MG: 20 INJECTION, SOLUTION EPIDURAL; INFILTRATION; INTRACAUDAL; PERINEURAL at 12:01

## 2022-04-05 RX ADMIN — PROPOFOL 60 MG: 10 INJECTION, EMULSION INTRAVENOUS at 12:09

## 2022-04-05 RX ADMIN — OXYCODONE HYDROCHLORIDE 5 MG: 5 TABLET ORAL at 14:37

## 2022-04-05 RX ADMIN — FENTANYL CITRATE 25 MCG: 50 INJECTION, SOLUTION INTRAMUSCULAR; INTRAVENOUS at 11:42

## 2022-04-05 RX ADMIN — DEXMEDETOMIDINE HYDROCHLORIDE 2 MCG: 100 INJECTION, SOLUTION INTRAVENOUS at 10:09

## 2022-04-05 RX ADMIN — DEXMEDETOMIDINE HYDROCHLORIDE 4 MCG: 100 INJECTION, SOLUTION INTRAVENOUS at 09:36

## 2022-04-05 RX ADMIN — PROPOFOL 20 MG: 10 INJECTION, EMULSION INTRAVENOUS at 12:01

## 2022-04-05 RX ADMIN — PROPOFOL 60 MG: 10 INJECTION, EMULSION INTRAVENOUS at 09:30

## 2022-04-05 RX ADMIN — SODIUM CHLORIDE, POTASSIUM CHLORIDE, SODIUM LACTATE AND CALCIUM CHLORIDE: 600; 310; 30; 20 INJECTION, SOLUTION INTRAVENOUS at 08:31

## 2022-04-05 RX ADMIN — FENTANYL CITRATE 50 MCG: 50 INJECTION, SOLUTION INTRAMUSCULAR; INTRAVENOUS at 13:20

## 2022-04-05 RX ADMIN — PHENYLEPHRINE HYDROCHLORIDE 100 MCG: 10 INJECTION, SOLUTION INTRAMUSCULAR; INTRAVENOUS; SUBCUTANEOUS at 12:58

## 2022-04-05 RX ADMIN — MIDAZOLAM HYDROCHLORIDE 2 MG: 2 INJECTION, SOLUTION INTRAMUSCULAR; INTRAVENOUS at 09:23

## 2022-04-05 RX ADMIN — CLINDAMYCIN PHOSPHATE 600 MG: 600 INJECTION, SOLUTION INTRAVENOUS at 09:27

## 2022-04-05 RX ADMIN — GENTAMICIN SULFATE 80 MG: 40 INJECTION, SOLUTION INTRAMUSCULAR; INTRAVENOUS at 09:32

## 2022-04-05 RX ADMIN — DEXMEDETOMIDINE HYDROCHLORIDE 4 MCG: 100 INJECTION, SOLUTION INTRAVENOUS at 09:29

## 2022-04-05 RX ADMIN — FENTANYL CITRATE 25 MCG: 50 INJECTION, SOLUTION INTRAMUSCULAR; INTRAVENOUS at 11:12

## 2022-04-05 RX ADMIN — HYDROMORPHONE HYDROCHLORIDE 0.5 MG: 1 INJECTION, SOLUTION INTRAMUSCULAR; INTRAVENOUS; SUBCUTANEOUS at 13:32

## 2022-04-05 RX ADMIN — PHENYLEPHRINE HYDROCHLORIDE 50 MCG: 10 INJECTION INTRAVENOUS at 09:42

## 2022-04-05 RX ADMIN — LIDOCAINE HYDROCHLORIDE 60 MG: 20 INJECTION, SOLUTION INTRAVENOUS at 09:28

## 2022-04-05 RX ADMIN — PROPOFOL 200 MCG/KG/MIN: 10 INJECTION, EMULSION INTRAVENOUS at 09:30

## 2022-04-05 RX ADMIN — MIDAZOLAM 2 MG: 1 INJECTION INTRAMUSCULAR; INTRAVENOUS at 08:27

## 2022-04-05 RX ADMIN — LORAZEPAM 0.5 MG: 2 INJECTION INTRAMUSCULAR; INTRAVENOUS at 13:32

## 2022-04-05 RX ADMIN — DEXMEDETOMIDINE HYDROCHLORIDE 4 MCG: 100 INJECTION, SOLUTION INTRAVENOUS at 09:45

## 2022-04-05 RX ADMIN — PHENYLEPHRINE HYDROCHLORIDE 50 MCG: 10 INJECTION INTRAVENOUS at 09:43

## 2022-04-05 RX ADMIN — FENTANYL CITRATE 25 MCG: 50 INJECTION, SOLUTION INTRAMUSCULAR; INTRAVENOUS at 11:25

## 2022-04-05 RX ADMIN — FENTANYL CITRATE 25 MCG: 50 INJECTION, SOLUTION INTRAMUSCULAR; INTRAVENOUS at 10:12

## 2022-04-05 RX ADMIN — FENTANYL CITRATE 25 MCG: 50 INJECTION, SOLUTION INTRAMUSCULAR; INTRAVENOUS at 11:05

## 2022-04-05 RX ADMIN — PROPOFOL 100 MCG/KG/MIN: 10 INJECTION, EMULSION INTRAVENOUS at 12:10

## 2022-04-05 ASSESSMENT — PAIN SCALES - GENERAL
PAINLEVEL_OUTOF10: 6
PAINLEVEL_OUTOF10: 4
PAINLEVEL_OUTOF10: 7
PAINLEVEL_OUTOF10: 3
PAINLEVEL_OUTOF10: 2
PAINLEVEL_OUTOF10: 7
PAINLEVEL_OUTOF10: 5
PAINLEVEL_OUTOF10: 5
PAINLEVEL_OUTOF10: 4
PAINLEVEL_OUTOF10: 0
PAINLEVEL_OUTOF10: 0
PAINLEVEL_OUTOF10: 6
PAINLEVEL_OUTOF10: 4

## 2022-04-05 ASSESSMENT — PAIN DESCRIPTION - ORIENTATION
ORIENTATION: RIGHT;UPPER
ORIENTATION: RIGHT;UPPER
ORIENTATION: LEFT
ORIENTATION: RIGHT

## 2022-04-05 ASSESSMENT — PULMONARY FUNCTION TESTS
PIF_VALUE: 1
PIF_VALUE: 1
PIF_VALUE: 2
PIF_VALUE: 0
PIF_VALUE: 0
PIF_VALUE: 1
PIF_VALUE: 2
PIF_VALUE: 1
PIF_VALUE: 0
PIF_VALUE: 10
PIF_VALUE: 1
PIF_VALUE: 0
PIF_VALUE: 1
PIF_VALUE: 0
PIF_VALUE: 1
PIF_VALUE: 1
PIF_VALUE: 2
PIF_VALUE: 0
PIF_VALUE: 1
PIF_VALUE: 1
PIF_VALUE: 0
PIF_VALUE: 1
PIF_VALUE: 0
PIF_VALUE: 1
PIF_VALUE: 0
PIF_VALUE: 2
PIF_VALUE: 0
PIF_VALUE: 1
PIF_VALUE: 0
PIF_VALUE: 1
PIF_VALUE: 0
PIF_VALUE: 0
PIF_VALUE: 1
PIF_VALUE: 0
PIF_VALUE: 1
PIF_VALUE: 0
PIF_VALUE: 0
PIF_VALUE: 5
PIF_VALUE: 1
PIF_VALUE: 0
PIF_VALUE: 1
PIF_VALUE: 0
PIF_VALUE: 1
PIF_VALUE: 24
PIF_VALUE: 13
PIF_VALUE: 1
PIF_VALUE: 1
PIF_VALUE: 0
PIF_VALUE: 1
PIF_VALUE: 0
PIF_VALUE: 1
PIF_VALUE: 1
PIF_VALUE: 0
PIF_VALUE: 1
PIF_VALUE: 0
PIF_VALUE: 1
PIF_VALUE: 0
PIF_VALUE: 1
PIF_VALUE: 0
PIF_VALUE: 1
PIF_VALUE: 1
PIF_VALUE: 0
PIF_VALUE: 17
PIF_VALUE: 1
PIF_VALUE: 0
PIF_VALUE: 1
PIF_VALUE: 0
PIF_VALUE: 1
PIF_VALUE: 2
PIF_VALUE: 37
PIF_VALUE: 0
PIF_VALUE: 1
PIF_VALUE: 1

## 2022-04-05 ASSESSMENT — PAIN DESCRIPTION - DESCRIPTORS
DESCRIPTORS: BURNING
DESCRIPTORS: ACHING;BURNING
DESCRIPTORS: ACHING

## 2022-04-05 ASSESSMENT — PAIN DESCRIPTION - LOCATION
LOCATION: CHEST
LOCATION: NECK
LOCATION: CHEST
LOCATION: CHEST

## 2022-04-05 ASSESSMENT — PAIN DESCRIPTION - ONSET: ONSET: UNABLE TO TELL

## 2022-04-05 ASSESSMENT — PAIN DESCRIPTION - FREQUENCY
FREQUENCY: INTERMITTENT
FREQUENCY: CONTINUOUS
FREQUENCY: CONTINUOUS

## 2022-04-05 ASSESSMENT — PAIN - FUNCTIONAL ASSESSMENT: PAIN_FUNCTIONAL_ASSESSMENT: 0-10

## 2022-04-05 ASSESSMENT — PAIN DESCRIPTION - PAIN TYPE
TYPE: SURGICAL PAIN

## 2022-04-05 NOTE — PROGRESS NOTES
Patient to pacu 7 s/p REPOSITION OF RIGHT PORT INSERTION - Right, report received from CRNA, reported hemodynamically stable intra op, all vitals stable upon arrival, patient complaining of headache at this time. See emar.  X-ray completed at bedside

## 2022-04-05 NOTE — ANESTHESIA POSTPROCEDURE EVALUATION
Department of Anesthesiology  Postprocedure Note    Patient: Afsaneh Rocha  MRN: [id number]  YOB: 1975  Date of evaluation: 4/5/2022  Time:  3:30 PM     Procedure Summary     Date: 04/05/22 Room / Location: [address] [place] [address] / [address]    Anesthesia Start: 1376 Anesthesia Stop: 6219    Procedure: REPOSITION OF RIGHT PORT INSERTION (Right ) Diagnosis: (MALPOSITION OF RIGHT PORT)    Surgeons: Raffi Hale DO Responsible Provider: Farrukh Arnold MD    Anesthesia Type: general ASA Status: 3          Anesthesia Type: general    Holli Phase I: Holli Score: 10    Holli Phase II: Holli Score: 10    Last vitals: Reviewed and per EMR flowsheets.        Anesthesia Post Evaluation    Patient location during evaluation: PACU  Level of consciousness: awake  Complications: no  Multimodal analgesia pain management approach

## 2022-04-05 NOTE — H&P
Last H & P within the last 30 days. DIAGNOSIS:   Malignant neoplasm of left female breast, unspecified estrogen receptor status, unspecified site of breast ([facility] [C50.912]  Poor venous access [I87.8]    Procedure(s):  RIGHT POSSIBLE LEFT PORT A CATHETER INSERTION     History obtained from: Patient interview and EHR     HISTORY OF PRESENT ILLNESS:   Patient is a 40 y.o. female with new diagnosis of breast cancer (3/15/2022), who presents today for port placement in anticipation of planned treatment. Illness screening:  Patient denies recent fever, chills, worsening cough, or known sick exposure     Past Medical History:        Diagnosis Date    Cancer of overlapping sites of left breast ([facility] 3/15/2022    Sleep difficulties     Wears glasses      Past Surgical History:        Procedure Laterality Date    US BREAST NEEDLE BIOPSY LEFT Left 3/11/2022    US BREAST NEEDLE BIOPSY LEFT 3/11/2022 Michelle Clinton MD [facility] ULTRASOUND    US LYMPH NODE BIOPSY  3/11/2022    US LYMPH NODE BIOPSY 3/11/2022 Michelle Clinton MD [facility] ULTRASOUND    WISDOM TOOTH EXTRACTION  1999       Medications Prior to Admission:      Prior to Admission medications    Medication Sig Start Date End Date Taking?  Authorizing Provider   Probiotic Product (RA PROBIOTIC GUMMIES PO) Take by mouth   Yes Historical Provider, MD   vitamin C (ASCORBIC ACID) 500 MG tablet Take 500 mg by mouth 2 times daily   Yes Historical Provider, MD   esomeprazole (NEXIUM) 40 MG delayed release capsule Take 40 mg by mouth every morning (before breakfast) 9/1/21  Yes Historical Provider, MD   vitamin B-12 (CYANOCOBALAMIN) 100 MCG tablet Take 50 mcg by mouth daily    Historical Provider, MD   traZODone (DESYREL) 50 MG tablet Take 50 mg by mouth nightly    Historical Provider, MD   sertraline (ZOLOFT) 50 MG tablet Take 50 mg by mouth daily    Historical Provider, MD   Cholecalciferol (VITAMIN D) 50 MCG (2000 UT) CAPS capsule Take by mouth    Historical Provider, MD   Melatonin 10 MG TABS Take 1 tablet by mouth    Historical Provider, MD         Allergies:  Erythromycin, Fluticasone propionate, Penicillins, and Zyrtec [cetirizine]    PHYSICAL EXAM:      BP (!) 110/92   Pulse 92   Temp 98.3 °F (36.8 °C) (Temporal)   Resp 16   Ht 5' 4\" (1.626 m)   Wt 168 lb (76.2 kg)   LMP 03/13/2022 (Exact Date)   SpO2 96%   BMI 28.84 kg/m²      Airway:  Airway patent with no audible stridor. Heart:  Regular rate and rhythm. No murmur noted. Lungs:  No increased work of breathing, good air exchange, clear to auscultation bilaterally, no crackles or wheezing. Abdomen:  Soft, non-distended, non-tender, no rebound tenderness or guarding, and no masses palpated. ASSESSMENT AND PLAN     Patient is a 40 y.o. female with above specified procedure planned. 1.  The patients history and physical was obtained and signed off by the pre-admission testing department. Patient seen and focused exam done today- no new changes since last physical exam on 3/16/2022.    2.  Access to ancillary services are available per request of the provider.     FRANC Khan - CNP     4/5/2022

## 2022-04-05 NOTE — H&P
No changes to H and P since evaluation. She will be having bilateral mastectomies with neoadjuvant chemotherapy.  + PALB2 mutation. All questions were answered. She was marked prior to entering operating theater. No

## 2022-04-05 NOTE — PROGRESS NOTES
PACU Transfer to [facility]    Vitals:    04/05/22 1400   BP: 125/79   Pulse: 74   Resp: 10   Temp: 97.6 °F (36.4 °C)   SpO2: 99%         Intake/Output Summary (Last 24 hours) at 4/5/2022 1414  Last data filed at 4/5/2022 1200  Gross per 24 hour   Intake 150 ml   Output --   Net 150 ml       Pain assessment:   Pain Level: 3    Patient transferred to care of [facility] RN.    4/5/2022 2:14 PM

## 2022-04-05 NOTE — PROGRESS NOTES
Current Allergies: Erythromycin, Fluticasone propionate, Penicillins, and Zyrtec [cetirizine]    Procedure(s): Attempted RIGHT subclavian, Right IJ PORT A CATHETER INSERTION, ultrasound guidance    Admitted to PACU bed 8 from OR. Arrived on a stretcher . Attached to PACU monitoring system. Alarms and parameters set. Report received from anesthesia personnel. OR staff did not report skin issues that were observed while in OR  No problems reported intraoperatively. Pt arrived with oxygen per nasal cannula with oxygen at 6 liters. Athrombic wraps in place.

## 2022-04-05 NOTE — PROGRESS NOTES
Pt states her pain in her chest is gone and her headache is gone.  0/10  Then stated \"but if you asked me to rate my anxiety, I'd say it was a 10\"

## 2022-04-05 NOTE — PROGRESS NOTES
Took call from Dr Ector Thornton. Pt chest xray show improper placement of port. Was requested to call Dr Cruzito Barros to notifiy of results. Call placed to Dr Cruzito Barros cell. Message left to call PACU right away.

## 2022-04-05 NOTE — BRIEF OP NOTE
Brief Postoperative Note      Patient: Mandy Rod  YOB: 1975  MRN: [id number]    Date of Procedure: 4/5/2022    Pre-Op Diagnosis: Malignant neoplasm of left female breast, unspecified estrogen receptor status, unspecified site of breast ([facility] [C50.912]  Poor venous access [I87.8]    Post-Op Diagnosis: Same       Procedure(s):   Attempted RIGHT subclavian, Right IJ PORT A CATHETER INSERTION, ultrasound guidance    Surgeon(s):  Guru Wise DO    Assistant:  Surgical Assistant: Jens Mcmillan RN    Anesthesia: Monitor Anesthesia Care    Estimated Blood Loss (mL): Minimal    Complications: None    Specimens:   * No specimens in log *    Implants:  * No implants in log *      Drains: * No LDAs found *    Findings: *attempted right subclavian placed right internal jugular vein 6 F low profile port **    Electronically signed by Guru Wise DO on 4/5/2022 at 10:29 AM

## 2022-04-05 NOTE — ANESTHESIA POSTPROCEDURE EVALUATION
Department of Anesthesiology  Postprocedure Note    Patient: Swapna Santos  MRN: [id number]  YOB: 1975  Date of evaluation: 4/5/2022  Time:  10:55 AM     Procedure Summary     Date: 04/05/22 Room / Location: [address]    Anesthesia Start: 2198 Anesthesia Stop: 3501    Procedure: Attempted RIGHT subclavian, Right IJ PORT A CATHETER INSERTION, ultrasound guidance (N/A ) Diagnosis:       Malignant neoplasm of left female breast, unspecified estrogen receptor status, unspecified site of breast ([address]      Poor venous access      (Malignant neoplasm of left female breast, unspecified estrogen receptor status, unspecified site of breast ([address] [C50.912]  Poor venous access [I87.8])    Surgeons: Tu Barfield DO Responsible Provider: Malinda Nichole MD    Anesthesia Type: general ASA Status: 3          Anesthesia Type: general    Holli Phase I: Holli Score: 10    Holli Phase II:      Last vitals: Reviewed and per EMR flowsheets.        Anesthesia Post Evaluation    Patient location during evaluation: PACU  Level of consciousness: awake  Complications: no  Multimodal analgesia pain management approach

## 2022-04-05 NOTE — PROGRESS NOTES
Discharge instructions reviewed with patient/responsible adult and understanding verbalized. Discharge instructions signed and copies given. Patient discharged home with belongings. Patient left in w/c to go home with mom, patient stated pain was a 2 out of 10. No complaints.

## 2022-04-05 NOTE — BRIEF OP NOTE
Brief Postoperative Note      Patient: Justen Griffith  YOB: 1975  MRN: [id number]    Date of Procedure: 4/5/2022    Pre-Op Diagnosis: MALPOSITION OF RIGHT PORT    Post-Op Diagnosis: Same       Procedure(s):  REPOSITION OF RIGHT PORT INSERTION    Surgeon(s):  Kg William DO    Assistant:  Surgical Assistant: Rula Knox RN    Anesthesia: General    Estimated Blood Loss (mL): Minimal    Complications: None    Specimens:   * No specimens in log *    Implants:  * No implants in log *      Drains: * No LDAs found *    Findings: Catheter positioned in superior vena cava. Post operative chest X ray is pending.      Electronically signed by Kg William DO on 4/5/2022 at 1:13 PM

## 2022-04-05 NOTE — ANESTHESIA PRE PROCEDURE
Department of Anesthesiology  Preprocedure Note       Name:  Joaquin Park   Age:  40 y.o.  :  1975                                          MRN:  [id number]         Date:  2022      Surgeon: Griffin Verdugo):  Sacha Lai DO    Procedure: Procedure(s):  REPOSITION OF RIGHT PORT INSERTION    Medications prior to admission:   Prior to Admission medications    Medication Sig Start Date End Date Taking? Authorizing Provider   Probiotic Product (RA PROBIOTIC GUMMIES PO) Take by mouth    Historical Provider, MD   vitamin C (ASCORBIC ACID) 500 MG tablet Take 500 mg by mouth 2 times daily    Historical Provider, MD   vitamin B-12 (CYANOCOBALAMIN) 100 MCG tablet Take 50 mcg by mouth daily    Historical Provider, MD   traZODone (DESYREL) 50 MG tablet Take 50 mg by mouth nightly    Historical Provider, MD   sertraline (ZOLOFT) 50 MG tablet Take 50 mg by mouth daily    Historical Provider, MD   Cholecalciferol (VITAMIN D) 50 MCG ( UT) CAPS capsule Take by mouth    Historical Provider, MD   esomeprazole (NEXIUM) 40 MG delayed release capsule Take 40 mg by mouth every morning (before breakfast) 21   Historical Provider, MD   Melatonin 10 MG TABS Take 1 tablet by mouth    Historical Provider, MD       Current medications:    No current facility-administered medications for this visit. No current outpatient medications on file.      Facility-Administered Medications Ordered in Other Visits   Medication Dose Route Frequency Provider Last Rate Last Admin    lidocaine PF 1 % injection 1 mL  1 mL IntraDERmal Once PRN Violeta Juan MD        lactated ringers infusion   IntraVENous Continuous Violeta Juan MD   Stopped at 22 1525    sodium chloride flush 0.9 % injection 5-40 mL  5-40 mL IntraVENous 2 times per day Violeta Juan MD        sodium chloride flush 0.9 % injection 5-40 mL  5-40 mL IntraVENous PRN Violeta Juan MD        0.9 % sodium chloride infusion  25 mL IntraVENous PRN Ursula Crenshaw MD        0.9 % sodium chloride infusion   IntraVENous Continuous Jarret Shown, MD        sodium chloride flush 0.9 % injection 5-40 mL  5-40 mL IntraVENous 2 times per day Jarret Shown, MD        sodium chloride flush 0.9 % injection 5-40 mL  5-40 mL IntraVENous PRN Jarret Shown, MD        0.9 % sodium chloride infusion  25 mL IntraVENous PRN Jarret Shown, MD        meperidine (DEMEROL) injection 12.5 mg  12.5 mg IntraVENous Q5 Min PRN Jarret Shown, MD        HYDROmorphone (DILAUDID) injection 0.5 mg  0.5 mg IntraVENous Q5 Min PRN Jarret Shown, MD   0.5 mg at 04/05/22 1332    ondansetron (ZOFRAN) injection 4 mg  4 mg IntraVENous Once PRN Jarret Shown, MD        prochlorperazine (COMPAZINE) 5 mg in sodium chloride (PF) 10 mL injection  5 mg IntraVENous Once PRN Jarret Shown, MD        diphenhydrAMINE (BENADRYL) injection 12.5 mg  12.5 mg IntraVENous Once PRN Jarret Shown, MD        labetalol (NORMODYNE;TRANDATE) injection 10 mg  10 mg IntraVENous Q15 Min PRN Jarret Shown, MD        midazolam (VERSED) injection 2 mg  2 mg IntraVENous Q5 Min PRN Jarret Shown, MD   2 mg at 04/05/22 0827    heparin flush 100 UNIT/ML injection    PRN Rosapaulina Hacking, DO   5 mL at 04/05/22 1304    bupivacaine (MARCAINE) 30 mL, lidocaine PF 1 % 30 mL    PRN Rosalynd Hacking, DO   3 mL at 04/05/22 1304    sodium chloride flush 0.9 % injection    PRN Rosalynd Hacking, DO   20 mL at 04/05/22 1304    fentaNYL (SUBLIMAZE) injection   IntraVENous PRN Lay Crawford, APRN - CRNA   50 mcg at 04/05/22 1320    propofol injection   IntraVENous Continuous PRN Lay Yvette, APRN - CRNA   Stopped at 04/05/22 1305    phenylephrine (KOSTAS-SYNEPHRINE) injection   IntraVENous PRN Lay Yvette, APRN - CRNA   100 mcg at 04/05/22 1258    lidocaine PF 2 % injection   IntraVENous PRN Lay Crawford, APRN - CRNA   100 mg at 04/05/22 1201    propofol injection IntraVENous PRN FRANC Lopez - CRNA   60 mg at 22 1209       Allergies: Allergies   Allergen Reactions    Erythromycin Other (See Comments)     Upset stomach    Fluticasone Propionate Dizziness or Vertigo    Penicillins Hives    Zyrtec [Cetirizine] Palpitations       Problem List:    Patient Active Problem List   Diagnosis Code    Sleep difficulties G47.9    Cancer of overlapping sites of left breast ([facility] C50.812       Past Medical History:        Diagnosis Date    Anxiety     Cancer of overlapping sites of left breast ([facility] 03/15/2022    Sleep difficulties     Wears glasses        Past Surgical History:        Procedure Laterality Date    US BREAST NEEDLE BIOPSY LEFT Left 3/11/2022    US BREAST NEEDLE BIOPSY LEFT 3/11/2022 Champ Beach MD [facility] MOB ULTRASOUND    US LYMPH NODE BIOPSY  3/11/2022    US LYMPH NODE BIOPSY 3/11/2022 Champ Beach MD [facility] MOB ULTRASOUND    WISDOM TOOTH EXTRACTION         Social History:    Social History     Tobacco Use    Smoking status: Former Smoker     Quit date: 2016     Years since quittin.6    Smokeless tobacco: Never Used   Substance Use Topics    Alcohol use: Yes     Comment: 3 times week                                Counseling given: Not Answered      Vital Signs (Current): There were no vitals filed for this visit.                                            BP Readings from Last 3 Encounters:   22 121/79   22 95/63   22 (!) 101/58       NPO Status:                                                                                 BMI:   Wt Readings from Last 3 Encounters:   22 168 lb (76.2 kg)   22 176 lb (79.8 kg)   22 174 lb (78.9 kg)     There is no height or weight on file to calculate BMI.    CBC:   Lab Results   Component Value Date    WBC 8.8 2022    RBC 4.65 2022    HGB 13.8 2022    HCT 40.9 2022    MCV 87.9 2022    RDW 13.1 2022     04/05/2022       CMP:   Lab Results   Component Value Date     04/05/2022    K 3.9 04/05/2022     04/05/2022    CO2 21 04/05/2022    BUN 20 04/05/2022    CREATININE 0.6 04/05/2022    GFRAA >60 04/05/2022    AGRATIO 1.6 04/05/2022    LABGLOM >60 04/05/2022    GLUCOSE 98 04/05/2022    PROT 6.9 04/05/2022    CALCIUM 9.0 04/05/2022    BILITOT 0.5 04/05/2022    ALKPHOS 53 04/05/2022    AST 13 04/05/2022    ALT 10 04/05/2022       POC Tests: No results for input(s): POCGLU, POCNA, POCK, POCCL, POCBUN, POCHEMO, POCHCT in the last 72 hours. Coags: No results found for: PROTIME, INR, APTT    HCG (If Applicable):   Lab Results   Component Value Date    PREGTESTUR Negative 04/05/2022        ABGs: No results found for: PHART, PO2ART, QYW8WBA, JNV9PRJ, BEART, U7LRYCDI     Type & Screen (If Applicable):  No results found for: LABABO, LABRH    Drug/Infectious Status (If Applicable):  No results found for: HIV, HEPCAB    COVID-19 Screening (If Applicable): No results found for: COVID19        Anesthesia Evaluation    Airway: Mallampati: II  TM distance: >3 FB   Neck ROM: full  Mouth opening: > = 3 FB Dental:          Pulmonary:                              Cardiovascular:            Rhythm: regular  Rate: normal                    Neuro/Psych:               GI/Hepatic/Renal:             Endo/Other:    (+) malignancy/cancer. Abdominal:             Vascular: Other Findings:               Anesthesia Plan      general     ASA 3       Induction: intravenous. Anesthetic plan and risks discussed with patient. Plan discussed with CRNA.                   Jean Pierre Mott MD   4/5/2022

## 2022-04-05 NOTE — PROGRESS NOTES
Dr Celia Ibrahim at bedside and informed patient that she needed to go back to OR for adjustment of port. Pt crying. Pt has remained NPO since arrival to PACU.

## 2022-04-05 NOTE — PROGRESS NOTES
Dr. Wiseman [place] here at bedside speaking with pt. Xray read by Doctor and WNL.  Drinking water

## 2022-04-05 NOTE — ANESTHESIA PRE PROCEDURE
Department of Anesthesiology  Preprocedure Note       Name:  Orlando Nickerson   Age:  40 y.o.  :  1975                                          MRN:  [id number]         Date:  2022      Surgeon: Yola Blackmon):  Romelia Pretty DO    Procedure: Procedure(s):  RIGHT POSSIBLE LEFT PORT A CATHETER INSERTION    Medications prior to admission:   Prior to Admission medications    Medication Sig Start Date End Date Taking?  Authorizing Provider   Probiotic Product (RA PROBIOTIC GUMMIES PO) Take by mouth   Yes Historical Provider, MD   vitamin C (ASCORBIC ACID) 500 MG tablet Take 500 mg by mouth 2 times daily   Yes Historical Provider, MD   esomeprazole (NEXIUM) 40 MG delayed release capsule Take 40 mg by mouth every morning (before breakfast) 21  Yes Historical Provider, MD   vitamin B-12 (CYANOCOBALAMIN) 100 MCG tablet Take 50 mcg by mouth daily    Historical Provider, MD   traZODone (DESYREL) 50 MG tablet Take 50 mg by mouth nightly    Historical Provider, MD   sertraline (ZOLOFT) 50 MG tablet Take 50 mg by mouth daily    Historical Provider, MD   Cholecalciferol (VITAMIN D) 50 MCG (2000) CAPS capsule Take by mouth    Historical Provider, MD   Melatonin 10 MG TABS Take 1 tablet by mouth    Historical Provider, MD       Current medications:    Current Facility-Administered Medications   Medication Dose Route Frequency Provider Last Rate Last Admin    lidocaine PF 1 % injection 1 mL  1 mL IntraDERmal Once PRN Orlando Pack MD        lactated ringers infusion   IntraVENous Continuous Orlando Pack MD        sodium chloride flush 0.9 % injection 5-40 mL  5-40 mL IntraVENous 2 times per day Orlando Pack MD        sodium chloride flush 0.9 % injection 5-40 mL  5-40 mL IntraVENous PRN Orlando Pack MD        0.9 % sodium chloride infusion  25 mL IntraVENous PRN Orlando Pack MD        clindamycin (CLEOCIN) 600 mg in dextrose 5 % 50 mL IVPB  600 mg IntraVENous Once [facility] Stephen Orta,         gentamicin (GARAMYCIN) 80 mg in dextrose 5 % 100 mL IVPB  80 mg IntraVENous Once Evelene Necessary, DO        0.9 % sodium chloride infusion   IntraVENous Continuous Angus Hand MD        sodium chloride flush 0.9 % injection 5-40 mL  5-40 mL IntraVENous 2 times per day Angus Hand MD        sodium chloride flush 0.9 % injection 5-40 mL  5-40 mL IntraVENous PRN Angus Hand MD        0.9 % sodium chloride infusion  25 mL IntraVENous PRN Angus Hand MD        meperidine (DEMEROL) injection 12.5 mg  12.5 mg IntraVENous Q5 Min PRN Angus Hand MD        fentaNYL (SUBLIMAZE) injection 25 mcg  25 mcg IntraVENous Q5 Min PRN Angus Hand MD        HYDROmorphone (DILAUDID) injection 0.5 mg  0.5 mg IntraVENous Q5 Min PRN Angus Hand MD        oxyCODONE (ROXICODONE) immediate release tablet 5 mg  5 mg Oral PRN Angus Hand MD        Or    oxyCODONE (ROXICODONE) immediate release tablet 10 mg  10 mg Oral PRN Angus Hand MD        ondansetron [facility]) injection 4 mg  4 mg IntraVENous Once PRN Angus Hand MD        prochlorperazine (COMPAZINE) 5 mg in sodium chloride (PF) 10 mL injection  5 mg IntraVENous Once PRN Angus Hand MD        LORazepam (ATIVAN) injection 0.5 mg  0.5 mg IntraVENous Once PRN Angus Hand MD        diphenhydrAMINE (BENADRYL) injection 12.5 mg  12.5 mg IntraVENous Once PRN Angus Hand MD        labetalol (NORMODYNE;TRANDATE) injection 10 mg  10 mg IntraVENous Q15 Min PRN Angus Hand MD           Allergies:     Allergies   Allergen Reactions    Erythromycin Other (See Comments)     Upset stomach    Fluticasone Propionate Dizziness or Vertigo    Penicillins Hives    Zyrtec [Cetirizine] Palpitations       Problem List:    Patient Active Problem List   Diagnosis Code    Sleep difficulties G47.9    Cancer of overlapping sites of left breast ([facility] [address] N35.829       Past Medical History:        Diagnosis Date    Anxiety     Cancer of overlapping sites of left breast ([address] 3/15/2022    Sleep difficulties     Wears glasses        Past Surgical History:        Procedure Laterality Date    US BREAST NEEDLE BIOPSY LEFT Left 3/11/2022    US BREAST NEEDLE BIOPSY LEFT 3/11/2022 Elder Vasquez MD [facility] ULTRASOUND    US LYMPH NODE BIOPSY  3/11/2022    US LYMPH NODE BIOPSY 3/11/2022 Elder Vasquez MD [facility] ULTRASOUND    WISDOM TOOTH EXTRACTION         Social History:    Social History     Tobacco Use    Smoking status: Former Smoker     Quit date: 2016     Years since quittin.6    Smokeless tobacco: Never Used   Substance Use Topics    Alcohol use: Yes     Comment: 3 times week                                Counseling given: Not Answered      Vital Signs (Current):   Vitals:    22 1411 22 0713   BP:  (!) 110/92   Pulse:  92   Resp:  16   Temp:  98.3 °F (36.8 °C)   TempSrc:  Temporal   SpO2:  96%   Weight:  168 lb (76.2 kg)   Height: 5' 4\" (1.626 m) 5' 4\" (1.626 m)                                              BP Readings from Last 3 Encounters:   22 (!) 110/92   22 120/87   18 112/76       NPO Status: Time of last liquid consumption:                         Time of last solid consumption:                         Date of last liquid consumption: 22                        Date of last solid food consumption: 22    BMI:   Wt Readings from Last 3 Encounters:   22 168 lb (76.2 kg)   22 176 lb (79.8 kg)   22 174 lb (78.9 kg)     Body mass index is 28.84 kg/m². CBC: No results found for: WBC, RBC, HGB, HCT, MCV, RDW, PLT    CMP: No results found for: NA, K, CL, CO2, BUN, CREATININE, GFRAA, AGRATIO, LABGLOM, GLUCOSE, GLU, PROT, CALCIUM, BILITOT, ALKPHOS, AST, ALT    POC Tests: No results for input(s): POCGLU, POCNA, POCK, POCCL, POCBUN, POCHEMO, POCHCT in the last 72 hours.     Coags: No results found for: PROTIME, INR, APTT    HCG (If Applicable):   Lab Results   Component Value Date    PREGTESTUR Negative 04/05/2022        ABGs: No results found for: PHART, PO2ART, HUZ9GIR, QUF4MUM, BEART, F1PSKQIL     Type & Screen (If Applicable):  No results found for: LABABO, LABRH    Drug/Infectious Status (If Applicable):  No results found for: HIV, HEPCAB    COVID-19 Screening (If Applicable): No results found for: COVID19        Anesthesia Evaluation    Airway: Mallampati: II  TM distance: >3 FB   Neck ROM: full  Mouth opening: > = 3 FB Dental:          Pulmonary:                              Cardiovascular:            Rhythm: regular  Rate: normal                    Neuro/Psych:               GI/Hepatic/Renal:             Endo/Other:    (+) malignancy/cancer. Abdominal:             Vascular: Other Findings:             Anesthesia Plan      general     ASA 3       Induction: intravenous. Anesthetic plan and risks discussed with patient. Plan discussed with CRNA.                   Jamin Verdugo MD   4/5/2022

## 2022-04-05 NOTE — OP NOTE
Operative Note      Patient: Teodora Tsang  YOB: 1975  MRN: [id number]    Date of Procedure: 4/5/2022    Pre-Op Diagnosis: Malignant neoplasm of left female breast, unspecified estrogen receptor status, unspecified site of breast ([facility] [C50.912]  Poor venous access [I87.8]    Post-Op Diagnosis: Same       Procedure(s): Attempted RIGHT subclavian, Right IJ PORT A CATHETER INSERTION, ultrasound guidance  And flouroscopic guidance    Surgeon(s):  Olinda Quiñones DO    Assistant:   Surgical Assistant: Drew Zambrano RN    Anesthesia: Monitor Anesthesia Care    Estimated Blood Loss (mL): Minimal    Complications:Catheter malpositioned found on chest xray in PACU    Specimens:   * No specimens in log *    Implants:  Implant Name Type Inv. Item Serial No.  Lot No. LRB No. Used Action   PORT INFUS 6FR SLIM POLYUR ATTCH OPN END SGL LUMN HECTOR CATH - [id number]  PORT INFUS 6FR SLIM POLYUR ATTCH OPN END SGL LUMN HECTOR CATH  [employer]- [id number] N/A 1 Implanted         Drains: * No LDAs found *    Findings: Attempted right subclavian vein, successful right internal jugular vein portacatheter insertion. IN PACU- post operative chest x ray showed tip in right ventricle. Detailed Description of Procedure:   Patient is a 41year old female with a history of left breast cancer and is in need of neoadjuvant chemotherapy. She is in need of venous access,  and I was asked by her oncologist to place a port. The risks, benefits and procedure were explained to the patient and consent was obtained. The patient was marked in the pre-operative area for correct site. She was taken to the operating theater. SCD's were placed and a pillow was secured under her knees. A roll was placed between her shoulder blades. MAC anesthesia was introduced by the CRNA. The area was prepped and draped in a normal sterile fashion and a surgical pause was intitiated and completed.   I anesthetized the area with 1% lidocaine and 0.5% Marcaine. I used an 18 G needle on a 1 cc syringe to attempt entry in the right subclavian vein. This was not successful. I positioned her head to the left. I used the ultrasound to located the internal jugular vein on the right and anesthetized the area. Using the 18 G needle again, I was able to enter the internal jugular vein. Blood was returned in the needle, I removed the needle and inserted the guidewire. It was confirmed with flouroscopy. I then made a skin incision at the chest and created a pocket. I gave more local to the chest and neck to tunnel the catheter to the guidewire. The dilator was placed over the guidewire and flouroscopy was used to monitor the catheter placement. The guidewire was removed and the catheter was placed in the dilator with placement determined by flouroscopy. The catheter was attached to the port hub and the port hub was secured in the pocket with 3-0 PDS suture. Position was checked with flouroscopy to be near superior vena cava/right atrium  A Mazariegos needle was used to withdrawal blood and flush the port with normal saline and heparin flush. Heparin flush was 5 cc. The skin was closed with a 3-0 vicryl in a subcutaneous manner and 4-0 Monocryl in a subcuticular fashion. Dermabond was used to close the skin. She tolerated the procedure well and went to the post anesthesia care unit in stable condition. All needle counts and sponge counts were correct. In PACU, post operative chest x ray was taken and catheter tip was found to be in right ventricle. She will return to the operating room for reposition as soon as a room is available. Family was notified and has given consent for return to operating room. Dictation for that procedure will be in another dictation.      Electronically signed by Lucia Cedeno DO on 4/5/2022 at 12:01 PM

## 2022-04-06 NOTE — OP NOTE
Operative Note      Patient: Dung Treviño  YOB: 1975  MRN: [id number]    Date of Procedure: 4/5/2022    Pre-Op Diagnosis: MALPOSITION OF RIGHT PORT    Post-Op Diagnosis: Same       Procedure(s):  REPOSITION OF RIGHT PORT INSERTION . Surgeons use of flouroscopy    Surgeon(s):  Clau Santa DO    Assistant:   Surgical Assistant: Rosi Booker RN    Anesthesia: General    Estimated Blood Loss (mL): Minimal    Complications: None    Specimens:   * No specimens in log *    Implants:  * No implants in log *      Drains: * No LDAs found *    Findings: Port catheter was positioned too deep in right ventricle. Repositioned to tip in the superior vena cava    Detailed Description of Procedure:   Patient is a 40year old female with a history of left breast cancer of overlapping sites and is in need of chemotherapy. She is in need of venous access,  and I was asked by her oncologist to place a port. She had a port placed earlier this day, post operative chest Xray showed the tip in the right ventricle and needed to be repositioned. The risks, benefits and procedure were explained to the patient, her wife and mother  and consent was obtained to reposition. She was taken to the operating theater. SCD's were placed and a pillow was secured under her knees. A roll was placed between her shoulder blades. MAC anesthesia was introduced by the CRNA. The area was prepped and draped in a normal sterile fashion and a surgical pause was intitiated and completed. I anesthetized the area with 1% lidocaine and 0.5% Marcaine. The catheter tip  placement was confirmed with flouroscopy. I The catheter was removed from the port hub, withdrawn to proper placement on flouroscopy and reattached to the hub. A Mazariegos needle was used to withdrawal blood and flush the port with normal saline and heparin flush. Heparin flush was 5 cc.   The skin was closed with a 3-0 vicryl in a subcutaneous manner and 4-0 Monocryl in a subcuticular fashion. Dermabond was used to close the skin. She tolerated the procedure well and went to the post anesthesia care unit in stable condition. All needle counts and sponge counts were correct. Post-operative chest X ray is pending.       Electronically signed by Tariq Manriquez DO on 4/6/2022 at 4:25 PM

## 2022-04-07 LAB
Lab: NORMAL
REPORT: NORMAL
THIS TEST SENT TO: NORMAL

## 2022-07-31 NOTE — PROGRESS NOTES
Palpitations     FHx: Past history of breast CA: Yes (multiple members)    Meds:   Current Outpatient Medications   Medication Sig Dispense Refill    Probiotic Product (RA PROBIOTIC GUMMIES PO) Take by mouth      vitamin C (ASCORBIC ACID) 500 MG tablet Take 500 mg by mouth 2 times daily      vitamin B-12 (CYANOCOBALAMIN) 100 MCG tablet Take 50 mcg by mouth daily      traZODone (DESYREL) 50 MG tablet Take 50 mg by mouth nightly      sertraline (ZOLOFT) 50 MG tablet Take 50 mg by mouth daily      Cholecalciferol (VITAMIN D) 50 MCG (2000 UT) CAPS capsule Take by mouth      esomeprazole (NEXIUM) 40 MG delayed release capsule Take 40 mg by mouth every morning (before breakfast)      Melatonin 10 MG TABS Take 1 tablet by mouth       No current facility-administered medications for this visit. SocHx: Smoking: No    ETOH: occasional    Drug use: No    ROS   Constitutional: Negative for chills and fever. HENT: Negative for congestion, facial swelling, and voice change. Eyes: Negative for photophobia and visual disturbance. Respiratory: Negative for apnea, cough, chest tightness and shortness of breath. Cardiovascular: Negative for chest pain and palpitations. Gastrointestinal: Negative for dysphagia and early satiety. Genitourinary: Negative for difficulty urinating, dysuria, flank pain, frequency and hematuria. Musculoskeletal: Negative for new gait problem, joint swelling and myalgias. Skin: Negative for color change, pallor and rash. Endocrine: negative for tremors, temperature intolerance or polydipsia. Allergic/Immunologic: Negative for new environmental or food allergies. Neurological: Negative for dizziness, seizures, speech difficulty, numbness. Hematological: Negative for adenopathy. Psychiatric/Behavioral: Negative for agitation and confusion.       EXAM  /83   Pulse 98   Temp 97.1 °F (36.2 °C)   Ht 5' 4\" (1.626 m)   Wt 186 lb 3.2 oz (84.5 kg)   SpO2 97%   BMI 31.96 kg/m² GEN: NAD, pleasant, healthy  CVS: RRR  PULM: No respiratory distress  HEENT: PERRLA/EOMI; hearing appears within normal limits  NECK: Supple with trachea in midline, no masses  EXT: No lymphedema noted  ABD: soft/NT/ND   NEURO: No focal deficits, no obvious CN deficits  BACK: Bilateral latiss muscle intact  BREAST:   Physical Exam    Bra size: 36DD  Desired bra size: C  Ptosis grade:   R: 3   L: 3  The left breast size is larger than the right breast.  There was one palpable mass  with no palpable lymphadenopathy in the ipsilateral left axilla. Nipple retraction: No    Left breast sternal notch to nipple: 28.6 cm  Right breast sternal notch to nipple: 26 cm    Left breast nipple to inframammary fold: 10.2 cm  Right breast nipple to inframammary fold: 9 cm    Left breast width 15.2 cm  Right breast width 13.8 cm    IMAGING: Reviewed    IMP: 40 y.o. female with breast cancer who presents for discussion regarding reconstruction options  PLAN: Would benefit from bilateral Avina pattern Autoderm TE reconstruction in the first stage as she will need adjuvant radiation. Will then work with Dr. Parisa Loaiza to schedule. A discussion regarding surgical options including immediate vs delayed approaches, implant based vs autologous, as well as additional planned revisional surgeries was performed with the patient and family. Multiple autologous donor sites were discussed as well. Clinical photos were obtained. We additionally discussed the FDA recommendations regarding monitoring of silicone implants. The risks, benefits, alternatives, outcomes, personnel involved were discussed. Specifically, the risks including, but not limited to: bleeding that may necessitate transfusion or operation, infection, seroma, reoperation, nonhealing, poor cosmetic outcome, asymmetry, scarring, partial or total flap loss, donor site morbidity, VTE (DVT/PE), and death were discussed.   All questions were answered in a satisfactory manner according to the patient.      Dimitrios Rodriguez MD  [address] [facility]  08/01/22

## 2022-08-01 ENCOUNTER — OFFICE VISIT (OUTPATIENT)
Dept: SURGERY | Age: 47
End: 2022-08-01
Payer: COMMERCIAL

## 2022-08-01 VITALS
BODY MASS INDEX: 31.79 KG/M2 | DIASTOLIC BLOOD PRESSURE: 83 MMHG | WEIGHT: 186.2 LBS | HEART RATE: 98 BPM | SYSTOLIC BLOOD PRESSURE: 115 MMHG | OXYGEN SATURATION: 97 % | TEMPERATURE: 97.1 F | HEIGHT: 64 IN

## 2022-08-01 DIAGNOSIS — Z17.0 MALIGNANT NEOPLASM OF LEFT BREAST IN FEMALE, ESTROGEN RECEPTOR POSITIVE, UNSPECIFIED SITE OF BREAST (HCC): Primary | ICD-10-CM

## 2022-08-01 DIAGNOSIS — C50.912 MALIGNANT NEOPLASM OF LEFT BREAST IN FEMALE, ESTROGEN RECEPTOR POSITIVE, UNSPECIFIED SITE OF BREAST (HCC): Primary | ICD-10-CM

## 2022-08-01 PROCEDURE — 99214 OFFICE O/P EST MOD 30 MIN: CPT | Performed by: SURGERY

## 2022-08-01 NOTE — LETTER
Surgery Schedule Request Form  [facility]  [address] [place] [address]. [place], [address] [place] [address] Av  DATE OF SURGERY: 2022      TIME OF SURGERY: 7:30 am      Confirmation#:__________________        Surgeon Name: Bhumi Briggs MD    Phone: [phone]     Fax: [phone]  Co-Case Additional Surgeon: Patricia Galarza DO  Procedure Name: 1) Bilateral breast reconstruction with stage 1 tissue expander placement with Alloderm and creation of Autoderm flaps  CPT CODES (required for scheduling): 31196 & 16824  DIAGNOSIS: C50.912  Breast Cancer    LENGTH OF PROCEDURE: 2.5 hours  Patient Status: 23 hour observation    Labs Needed:   CBC _x__  PT/PTT_x__ INR __x__  CMP _x__ EKG __x_   Urine Hcg ___            PATIENT NAME: Ignacio Long            YOB: 1975  SEX: female   SS #:   PHONE: [phone] (home)     Pre-Op to be done by: PCP  Cardiac Clearance Done by: per PCP    Pt Position:  supine  Patient to meet with Anesthesiology prior to surgery: no     Medications to be stopped 5 days before surgery: anticoagulation     Ancef 2 gm IV OCTOR (NOTE:  If patient weight is > 120 kg, Administer 3 gm)  Other Orders: Transexemic acid 1g IV OCTOR; No Decadron; SA    INSURANCE: BCBS                                             SUBSCRIBER NAME: Rick Corona   MEMBER ID:   [id number]                                              AUTHORIZATION #: [id number]    PCP: Jonah Stout                                        ANESTHESIA:  General    Bhumi Briggs MD                             FAX TO: [phone]   QUESTIONS?  CALL: [phone]   Fax   [phone]            Date Of Procedure: 2022    PATIENT:       Ignacio Long                    :  1975        Allergies   Allergen Reactions    Erythromycin Other (See Comments)     Upset stomach    Fluticasone Propionate Dizziness or Vertigo    Penicillins Hives    Zyrtec [Cetirizine] Palpitations       No.   PHYSICIAN ORDERS   HUC/  RN      ORDERS WITH CHECK BOXES MUST BE SELECTED. ALL OTHER ORDERS WILL BE AUTOMATICALLY INITIATED. Date / Time of Order:   8/17/22   9:18 AM          Procedure: Bilateral breast reconstruction with stage 1 tissue expander placement with Alloderm and creation of Autoderm flaps       1. Cefazolin (Ancef) 2 gm IV OCTOR   ** NOTE:  If patient weight is > 120 kg, Administer 3 gm         2. ** If PCN allergy, then Clindamycin 600mg IV OCTOR.   ** If prior history of MRSA, Vancomycin 1g IV OCTOR (please check with renal function prior to administration)       3.  Other orders: Transexemic acid 1g IV OCTOR; No Decadron; SA       Physician / Surgeon:  Rosalva Couch MD  Office Ph:  [phone]       Physician Signature:     9/07

## 2022-08-02 ENCOUNTER — TELEPHONE (OUTPATIENT)
Dept: SURGERY | Age: 47
End: 2022-08-02

## 2022-08-02 NOTE — TELEPHONE ENCOUNTER
The patient was in the office to see Dr. Jony Palmer yesterday. PLAN: Would benefit from bilateral Avina pattern Autoderm TE reconstruction in the first stage as she will need adjuvant radiation. Will then work with Dr. Geovanni Chin to schedule    I received a surgery letter. I spoke with Lee WOLFE at [address] [facility] ([phone]) to see if CPT Code 29675 or 06403 require pre certification. The codes do require pre certification, which I started during our call. I will fax clinicals to [phone]. I will scan the information that I faxed and the fax success into Epic under the media tab. Pending Authorization # [id number]    Time HELD 9- 7:30 am.     I spoke with the patient at the home number listed to provide an insurance update. I will leave this phone note open.

## 2022-08-15 NOTE — TELEPHONE ENCOUNTER
I received a fax from Rasheeda Welch dated 8-. I will scan the fax into Epic under the media tab. APPROVED  Reference number [id number]  CPT Code 50384, 15176  Date Range 9- to 9-    I lmom for the patient at the home number listed. I requested a call back to discuss insurance and surgery scheduling. I will leave this phone note open.

## 2022-08-15 NOTE — TELEPHONE ENCOUNTER
I received a fax from [address] [place] [address] dated 8-. The fax requested clinicals be faxed. I spoke with Jr Lynn at [address] ([phone]) to follow up on Pending Authorization # [id number]. I advised that I faxed clinicals on 8-2-2022 and received a success. She stated that clinicals were not received and asked that I refax them. I asked that the clinicals be expedited. DONE. I will scan the information that I faxed and the fax success into Epic under the media tab. I will leave this phone note open.

## 2022-08-17 NOTE — TELEPHONE ENCOUNTER
I spoke with the patient on the home number listed. The patient is now scheduled for surgery with  on 9-. The patient is aware of H&P. The patient is scheduled for her post op appointment 10-. I will submit the surgery letter today. I will fax the Alloderm order today. I will scan the order and the fax success into Epic under the media tab. I will mail the surgery information and instructions to the patient today. I will close this phone note.

## 2022-09-08 ENCOUNTER — HOSPITAL ENCOUNTER (OUTPATIENT)
Dept: MRI IMAGING | Age: 47
Discharge: HOME | End: 2022-09-08
Payer: COMMERCIAL

## 2022-09-08 DIAGNOSIS — C50.812 CANCER OF OVERLAPPING SITES OF LEFT BREAST (HCC): ICD-10-CM

## 2022-09-08 PROCEDURE — A9579 GAD-BASE MR CONTRAST NOS,1ML: HCPCS | Performed by: INTERNAL MEDICINE

## 2022-09-08 PROCEDURE — 6360000004 HC RX CONTRAST MEDICATION: Performed by: INTERNAL MEDICINE

## 2022-09-08 PROCEDURE — C8908 MRI W/O FOL W/CONT, BREAST,: HCPCS

## 2022-09-08 RX ADMIN — GADOTERIDOL 19 ML: 279.3 INJECTION, SOLUTION INTRAVENOUS at 16:02

## 2022-09-09 ENCOUNTER — NURSE ONLY (OUTPATIENT)
Dept: SURGERY | Age: 47
End: 2022-09-09

## 2022-09-09 DIAGNOSIS — C50.912 MALIGNANT NEOPLASM OF LEFT BREAST IN FEMALE, ESTROGEN RECEPTOR POSITIVE, UNSPECIFIED SITE OF BREAST (HCC): Primary | ICD-10-CM

## 2022-09-09 DIAGNOSIS — Z17.0 MALIGNANT NEOPLASM OF LEFT BREAST IN FEMALE, ESTROGEN RECEPTOR POSITIVE, UNSPECIFIED SITE OF BREAST (HCC): Primary | ICD-10-CM

## 2022-09-09 NOTE — PROGRESS NOTES
[facility]  Breast Cancer Education    Patient: Domenica Bell  YOB: 1975    HPI: Domenica Bell is a 40 y.o. female who presents today for breast cancer education. She was found to have left breast cancer and desires to proceed with bilateral mastectomy with reconstruction (without nipple sparing). Greater than 60 minutes was spent face to face with patient discussing preoperative and postoperative expectations, including wound care, surgical bra, drain care, medications, nutrition intake of increased protein and activity restrictions. Implant and tissue expander reconstruction was discussed , including TE fill timeline, implant surveillance and follow up. Karina and latissmus flap reconstruction was briefly discussed. Chief Complaint   Patient presents with    Other     Breast Cancer Education       Plan: Brian Hernandez wishes to proceed with bilateral mastectomy with bilateral breast reconstruction with stage 1 tissue expander placement with Alloderm and creation of Autoderm flaps. Surgery is scheduled 09/30/2022.          CHELLY HamN, RN [address] [place] [address] 177 and Reconstructive Surgery  [phone]

## 2022-09-19 DIAGNOSIS — C50.812 CANCER OF OVERLAPPING SITES OF LEFT BREAST (HCC): Primary | ICD-10-CM

## 2022-09-23 ENCOUNTER — HOSPITAL ENCOUNTER (OUTPATIENT)
Dept: ULTRASOUND IMAGING | Age: 47
Discharge: HOME | End: 2022-09-23
Payer: COMMERCIAL

## 2022-09-23 DIAGNOSIS — C50.912 BREAST CANCER METASTASIZED TO AXILLARY LYMPH NODE, LEFT (HCC): Primary | ICD-10-CM

## 2022-09-23 DIAGNOSIS — C50.812 CANCER OF OVERLAPPING SITES OF LEFT BREAST (HCC): ICD-10-CM

## 2022-09-23 DIAGNOSIS — C77.3 BREAST CANCER METASTASIZED TO AXILLARY LYMPH NODE, LEFT (HCC): Primary | ICD-10-CM

## 2022-09-23 PROCEDURE — 76882 US LMTD JT/FCL EVL NVASC XTR: CPT

## 2022-09-26 ENCOUNTER — HOSPITAL ENCOUNTER (OUTPATIENT)
Dept: CT IMAGING | Age: 47
Discharge: HOME | End: 2022-09-26
Payer: COMMERCIAL

## 2022-09-26 DIAGNOSIS — C77.3 BREAST CANCER METASTASIZED TO AXILLARY LYMPH NODE, LEFT (HCC): ICD-10-CM

## 2022-09-26 DIAGNOSIS — C50.912 BREAST CANCER METASTASIZED TO AXILLARY LYMPH NODE, LEFT (HCC): ICD-10-CM

## 2022-09-26 PROCEDURE — 2709999900 CT GUIDED NEEDLE PLACEMENT

## 2022-09-26 NOTE — H&P
Patient:  Mark Anthony Haile   :   1975      Relevant patient history reviewed and discussed. The procedure including risks and benefits was discussed at length with the patient (or designated family member) and all questions were answered. Informed consent to proceed with the procedure was given. Condition : stable    [facility] nurses notes reviewed and agreed. Medications reviewed. Allergies:    Allergies   Allergen Reactions    Erythromycin Other (See Comments)     Upset stomach    Fluticasone Propionate Dizziness or Vertigo    Penicillins Hives    Zyrtec [Cetirizine] Palpitations

## 2022-09-26 NOTE — PROCEDURES
IR Brief Postoperative Note    Fink Card  YOB: 1975  [id number]    Pre-operative Diagnosis: Breast cancer    Post-operative Diagnosis: Same    Procedure: L axillary node rfid placement    Anesthesia: local    Surgeons/Assistants: la nena    Estimated Blood Loss: Minimal    Complications: none    Specimens: were not obtained    See full procedure dictation to follow      Ji Babb MD MD  9/26/2022

## 2022-09-27 NOTE — PROGRESS NOTES
Place patient label inside box (if no patient label, complete below)  Name:  :  MR#:   Inés Hernandez / PROCEDURE  I (we), Gucci Shy (Patient Name) authorize Eleno Ceron MD (Provider / Shivam Rothman) and/or such assistants as may be selected by him/her, to perform the following operation/procedure(s): LEFT BREAST MODIFIED RADICAL MASTECTOMY, LEFT BREAST SENTINEL NODE BIOPSY, POSSIBLE AXILLARY LYMPH NODE [facility], RIGHT BREAST SIMPLE MASTECTOMY       Note: If unable to obtain consent prior to an emergent procedure, document the emergent reason in the medical record. This procedure has been explained to my (our) satisfaction and included in the explanation was: The intended benefit, nature, and extent of the procedure to be performed; The significant risks involved and the probability of success; Alternative procedures and methods of treatment; The dangers and probable consequences of such alternatives (including no procedure or treatment); The expected consequences of the procedure on my future health; Whether other qualified individuals would be performing important surgical tasks and/or whether  would be present to advise or support the procedure. I (we) understand that there are other risks of infection and other serious complications in the pre-operative/procedural and postoperative/procedural stages of my (our) care. I (we) have asked all of the questions which I (we) thought were important in deciding whether or not to undergo treatment or diagnosis. These questions have been answered to my (our) satisfaction. I (we) understand that no assurance can be given that the procedure will be a success, and no guarantee or warranty of success has been given to me (us).     It has been explained to me (us) that during the course of the operation/procedure, unforeseen conditions may be revealed that necessitate extension of the original procedure(s) or different procedure(s) than those set forth in Paragraph 1. I (we) authorize and request that the above-named physician, his/her assistants or his/her designees, perform procedures as necessary and desirable if deemed to be in my (our) best interest.     Revised 8/2/2021                                                                          Page 1 of 2       I acknowledge that health care personnel may be observing this procedure for the purpose of medical education or other specified purposes as may be necessary as requested and/or approved by my (our) physician. I (we) consent to the disposal by the hospital Pathologist of the removed tissue, parts or organs in accordance with hospital policy. I do ____ do not ____ consent to the use of a local infiltration pain blocking agent that will be used by my provider/surgical provider to help alleviate pain during my procedure. I do ____ do not ____ consent to an emergent blood transfusion in the case of a life-threatening situation that requires blood components to be administered. This consent is valid for 24 hours from the beginning of the procedure. This patient does ____ or does not ____ currently have a DNR status/order. If DNR order is in place, obtain Addendum to the Surgical Consent for ALL Patients with a DNR Order to address juice-operative status for limited intervention or DNR suspension.      I have read and fully understand the above Consent for Operation/Procedure and that all blanks were completed before I signed the consent.   _____________________________       _____________________      ____/____am/pm  Signature of Patient or legal representative      Printed Name / Relationship            Date / Time   ____________________________       _____________________      ____/____am/pm  Witness to Signature                                    Printed Name Date / Time    If patient is unable to sign or is a minor, complete the following)  Patient is a minor, ____ years of age, or unable to sign because:   ______________________________________________________________________________________________    If a phone consent is obtained, consent will be documented by using two health care professionals, each affirming that the consenting party has no questions and gives consent for the procedure discussed with the physician/provider.   _____________________          ____________________       _____/_____am/pm   2nd witness to phone consent        Printed name           Date / Time    Informed Consent:  I have provided the explanation described above in section 1 to the patient and/or legal representative.  I have provided the patient and/or legal representative with an opportunity to ask any questions about the proposed operation/procedure.   ___________________________          ____________________         ____/____am/pm  Provider / Proceduralist                            Printed name            Date / Time  Revised 8/2/2021                                                                      Page 2 of 2

## 2022-09-27 NOTE — PROGRESS NOTES
PRE-OP INSTRUCTIONS FOR SURGICAL PATIENTS          Our Pre-admission Testing Nurses tried and were unable to reach you today. Please read the attached instructions if you did not listen to your voicemail. Follow all instructions provided to you from your surgeon's office, including your ARRIVAL TIME. Arrange for someone to drive you home and be with you for the first 24 hours after discharge. NOTE: at this time ONLY 2 ADULTS may accompany you and everyone must be masked. Enter the MAIN entrance located on [address] and report to the surgical desk on the LEFT side of the lobby. Please park in the parking garage or there is free [employer] available after 7am for your use. Bring your insurance card & photo ID with you to register. Bring your medication list with you with dose and frequency listed (including over the counter medications)  Contact your ordering physician/surgeon for medication instructions as soon as possible, especially if taking blood thinners, aspirin, heart, or diabetic medication. Bariatric surgical patients need to call your surgeon if on diabetic medications (as some may need to be stopped 1-week preop)  A Pre-Surgical History and Physical MUST be completed WITHIN 30 DAYS OR LESS prior to your procedure by your Physician or an Urgent Care. DO NOT EAT ANYTHING 8 hours prior to arrival for surgery. You may have sips of WATER ONLY (up to 8 ounces) 4 hours prior to your arrival for surgery. Then nothing further 4 hours prior to arriving at hospital.   NOTE: ALL Gastric, Bariatric & Bowel surgery patients - you MUST follow your surgeon's instructions regarding eating/drinking as you will have very specific instructions to follow. If you did not receive these, call your surgeon's office immediately. No gum, candy, mints, or ice chips day of procedure. Please refrain from drinking alcohol the day before or day of your procedure.    Do not use any recreational marijuana at least 24 hours or street drugs (heroin, cocaine) at minimum 5 days prior to your procedure. Please do not smoke the day of your procedure. Dress in loose, comfortable clothing appropriate for redressing after your procedure. Do not wear jewelry (including body piercings), make-up, fingernail polish, lotion, powders, or metal hairclips. Contacts, glasses, dentures, and hearing aids will need to be removed prior to surgery. Bring your case(s) to protect them while you are in surgery. If you use a CPAP, please bring it with you on the day of your procedure. Do not shave or wax for 72 hours prior to procedure near your operative site. Leave all other valuables at home. IF there is a change in your physical condition for some reason, such as: a cold, fever, rash, cuts, sores, or any other infection, especially near your surgical site, contact your surgeon's office immediately. FOR WOMAN OF CHILDBEARING AGE ONLY- please make sure we can collect a urine sample upon arrival.     Instructions left on patient's voicemail.   Nidhi Patino RN.9/27/2022 .9:03 AM

## 2022-09-27 NOTE — PROGRESS NOTES
Place patient label inside box (if no patient label, complete below)  Name:  :  MR#:   Nicholas Pulling / PROCEDURE  I (we), Teodoro Crooks (Patient Name) authorize Alex Oneil MD (Provider / Kelsi Mcfadden) and/or such assistants as may be selected by him/her, to perform the following operation/procedure(s): BILATERAL BREAST RECONSTRUCTION WITH STAGE 1 TISSUE EXPANDER PLACEMENT WITH ALLODERM AND CREATION OF AUTODERM FLAPS       Note: If unable to obtain consent prior to an emergent procedure, document the emergent reason in the medical record. This procedure has been explained to my (our) satisfaction and included in the explanation was: The intended benefit, nature, and extent of the procedure to be performed; The significant risks involved and the probability of success; Alternative procedures and methods of treatment; The dangers and probable consequences of such alternatives (including no procedure or treatment); The expected consequences of the procedure on my future health; Whether other qualified individuals would be performing important surgical tasks and/or whether  would be present to advise or support the procedure. I (we) understand that there are other risks of infection and other serious complications in the pre-operative/procedural and postoperative/procedural stages of my (our) care. I (we) have asked all of the questions which I (we) thought were important in deciding whether or not to undergo treatment or diagnosis. These questions have been answered to my (our) satisfaction. I (we) understand that no assurance can be given that the procedure will be a success, and no guarantee or warranty of success has been given to me (us).     It has been explained to me (us) that during the course of the operation/procedure, unforeseen conditions may be revealed that necessitate extension of the original procedure(s) or different procedure(s) than those set forth in Paragraph 1. I (we) authorize and request that the above-named physician, his/her assistants or his/her designees, perform procedures as necessary and desirable if deemed to be in my (our) best interest.     Revised 8/2/2021                                                                          Page 1 of 2       I acknowledge that health care personnel may be observing this procedure for the purpose of medical education or other specified purposes as may be necessary as requested and/or approved by my (our) physician. I (we) consent to the disposal by the hospital Pathologist of the removed tissue, parts or organs in accordance with hospital policy. I do ____ do not ____ consent to the use of a local infiltration pain blocking agent that will be used by my provider/surgical provider to help alleviate pain during my procedure. I do ____ do not ____ consent to an emergent blood transfusion in the case of a life-threatening situation that requires blood components to be administered. This consent is valid for 24 hours from the beginning of the procedure. This patient does ____ or does not ____ currently have a DNR status/order. If DNR order is in place, obtain Addendum to the Surgical Consent for ALL Patients with a DNR Order to address juice-operative status for limited intervention or DNR suspension.      I have read and fully understand the above Consent for Operation/Procedure and that all blanks were completed before I signed the consent.   _____________________________       _____________________      ____/____am/pm  Signature of Patient or legal representative      Printed Name / Relationship            Date / Time   ____________________________       _____________________      ____/____am/pm  Witness to Signature                                    Printed Name                    Date / Time    If patient is unable to sign or is a minor, complete the following)  Patient is a minor, ____ years of age, or unable to sign because:   ______________________________________________________________________________________________    If a phone consent is obtained, consent will be documented by using two health care professionals, each affirming that the consenting party has no questions and gives consent for the procedure discussed with the physician/provider.   _____________________          ____________________       _____/_____am/pm   2nd witness to phone consent        Printed name           Date / Time    Informed Consent:  I have provided the explanation described above in section 1 to the patient and/or legal representative.  I have provided the patient and/or legal representative with an opportunity to ask any questions about the proposed operation/procedure.   ___________________________          ____________________         ____/____am/pm  Provider / Proceduralist                            Printed name            Date / Time  Revised 8/2/2021                                                                      Page 2 of 2

## 2022-09-27 NOTE — PROGRESS NOTES
Requested ekg tracing from PCP office Dr. Rody Andrews [phone] spoke with Migdalia Gallagher and she will fax.  -db

## 2022-09-30 ENCOUNTER — HOSPITAL ENCOUNTER (OUTPATIENT)
Age: 47
Setting detail: OUTPATIENT SURGERY
Discharge: HOME | End: 2022-09-30
Attending: SURGERY | Admitting: SURGERY
Payer: COMMERCIAL

## 2022-09-30 ENCOUNTER — HOSPITAL ENCOUNTER (OUTPATIENT)
Dept: NUCLEAR MEDICINE | Age: 47
Discharge: HOME | End: 2022-09-30
Payer: COMMERCIAL

## 2022-09-30 ENCOUNTER — APPOINTMENT (OUTPATIENT)
Dept: MAMMOGRAPHY | Age: 47
End: 2022-09-30
Attending: SURGERY
Payer: COMMERCIAL

## 2022-09-30 ENCOUNTER — ANESTHESIA EVENT (OUTPATIENT)
Dept: OPERATING ROOM | Age: 47
End: 2022-09-30
Payer: COMMERCIAL

## 2022-09-30 ENCOUNTER — ANESTHESIA (OUTPATIENT)
Dept: OPERATING ROOM | Age: 47
End: 2022-09-30
Payer: COMMERCIAL

## 2022-09-30 VITALS
RESPIRATION RATE: 16 BRPM | HEART RATE: 84 BPM | SYSTOLIC BLOOD PRESSURE: 115 MMHG | HEIGHT: 64 IN | TEMPERATURE: 96.1 F | OXYGEN SATURATION: 95 % | WEIGHT: 194.6 LBS | DIASTOLIC BLOOD PRESSURE: 73 MMHG | BODY MASS INDEX: 33.22 KG/M2

## 2022-09-30 DIAGNOSIS — Z17.0 MALIGNANT NEOPLASM OF RIGHT BREAST IN FEMALE, ESTROGEN RECEPTOR POSITIVE, UNSPECIFIED SITE OF BREAST (HCC): ICD-10-CM

## 2022-09-30 DIAGNOSIS — C50.911 MALIGNANT NEOPLASM OF RIGHT BREAST IN FEMALE, ESTROGEN RECEPTOR POSITIVE, UNSPECIFIED SITE OF BREAST (HCC): ICD-10-CM

## 2022-09-30 DIAGNOSIS — C50.919: ICD-10-CM

## 2022-09-30 DIAGNOSIS — Z17.0 MALIGNANT NEOPLASM OF LOWER-OUTER QUADRANT OF LEFT BREAST OF FEMALE, ESTROGEN RECEPTOR POSITIVE (HCC): ICD-10-CM

## 2022-09-30 DIAGNOSIS — R92.8 ABNORMAL MAMMOGRAM OF LEFT BREAST: ICD-10-CM

## 2022-09-30 DIAGNOSIS — C50.512 MALIGNANT NEOPLASM OF LOWER-OUTER QUADRANT OF LEFT BREAST OF FEMALE, ESTROGEN RECEPTOR POSITIVE (HCC): ICD-10-CM

## 2022-09-30 DIAGNOSIS — G89.18 POST-OPERATIVE PAIN: Primary | ICD-10-CM

## 2022-09-30 DIAGNOSIS — Z15.01 BRCA POSITIVE: ICD-10-CM

## 2022-09-30 DIAGNOSIS — Z15.09 BRCA POSITIVE: ICD-10-CM

## 2022-09-30 DIAGNOSIS — Z17.0 ESTROGEN RECEPTOR POSITIVE: ICD-10-CM

## 2022-09-30 LAB — PREGNANCY, URINE: NEGATIVE

## 2022-09-30 PROCEDURE — 88342 IMHCHEM/IMCYTCHM 1ST ANTB: CPT

## 2022-09-30 PROCEDURE — C1789 PROSTHESIS, BREAST, IMP: HCPCS | Performed by: SURGERY

## 2022-09-30 PROCEDURE — 6360000002 HC RX W HCPCS: Performed by: NURSE ANESTHETIST, CERTIFIED REGISTERED

## 2022-09-30 PROCEDURE — 7100000011 HC PHASE II RECOVERY - ADDTL 15 MIN: Performed by: SURGERY

## 2022-09-30 PROCEDURE — 88307 TISSUE EXAM BY PATHOLOGIST: CPT

## 2022-09-30 PROCEDURE — 2720000010 HC SURG SUPPLY STERILE: Performed by: SURGERY

## 2022-09-30 PROCEDURE — 6360000002 HC RX W HCPCS: Performed by: FAMILY MEDICINE

## 2022-09-30 PROCEDURE — 2500000003 HC RX 250 WO HCPCS: Performed by: NURSE ANESTHETIST, CERTIFIED REGISTERED

## 2022-09-30 PROCEDURE — A9541 TC99M SULFUR COLLOID: HCPCS | Performed by: SURGERY

## 2022-09-30 PROCEDURE — 6370000000 HC RX 637 (ALT 250 FOR IP): Performed by: FAMILY MEDICINE

## 2022-09-30 PROCEDURE — 2580000003 HC RX 258: Performed by: ANESTHESIOLOGY

## 2022-09-30 PROCEDURE — C9290 INJ, BUPIVACAINE LIPOSOME: HCPCS | Performed by: SURGERY

## 2022-09-30 PROCEDURE — 19357 TISS XPNDR PLMT BRST RCNSTJ: CPT | Performed by: SURGERY

## 2022-09-30 PROCEDURE — 15777 ACELLULAR DERM MATRIX IMPLT: CPT | Performed by: SURGERY

## 2022-09-30 PROCEDURE — 2709999900 HC NON-CHARGEABLE SUPPLY: Performed by: SURGERY

## 2022-09-30 PROCEDURE — A4217 STERILE WATER/SALINE, 500 ML: HCPCS | Performed by: SURGERY

## 2022-09-30 PROCEDURE — 2580000003 HC RX 258: Performed by: NURSE ANESTHETIST, CERTIFIED REGISTERED

## 2022-09-30 PROCEDURE — 84703 CHORIONIC GONADOTROPIN ASSAY: CPT

## 2022-09-30 PROCEDURE — 88331 PATH CONSLTJ SURG 1 BLK 1SPC: CPT

## 2022-09-30 PROCEDURE — 76098 X-RAY EXAM SURGICAL SPECIMEN: CPT

## 2022-09-30 PROCEDURE — 6360000002 HC RX W HCPCS: Performed by: SURGERY

## 2022-09-30 PROCEDURE — 7100000000 HC PACU RECOVERY - FIRST 15 MIN: Performed by: SURGERY

## 2022-09-30 PROCEDURE — 3700000001 HC ADD 15 MINUTES (ANESTHESIA): Performed by: SURGERY

## 2022-09-30 PROCEDURE — 3600000004 HC SURGERY LEVEL 4 BASE: Performed by: SURGERY

## 2022-09-30 PROCEDURE — 2580000003 HC RX 258: Performed by: SURGERY

## 2022-09-30 PROCEDURE — 7100000010 HC PHASE II RECOVERY - FIRST 15 MIN: Performed by: SURGERY

## 2022-09-30 PROCEDURE — 2500000003 HC RX 250 WO HCPCS: Performed by: SURGERY

## 2022-09-30 PROCEDURE — 78195 LYMPH SYSTEM IMAGING: CPT

## 2022-09-30 PROCEDURE — 3600000014 HC SURGERY LEVEL 4 ADDTL 15MIN: Performed by: SURGERY

## 2022-09-30 PROCEDURE — 3430000000 HC RX DIAGNOSTIC RADIOPHARMACEUTICAL: Performed by: SURGERY

## 2022-09-30 PROCEDURE — 7100000001 HC PACU RECOVERY - ADDTL 15 MIN: Performed by: SURGERY

## 2022-09-30 PROCEDURE — 88305 TISSUE EXAM BY PATHOLOGIST: CPT

## 2022-09-30 PROCEDURE — C1729 CATH, DRAINAGE: HCPCS | Performed by: SURGERY

## 2022-09-30 PROCEDURE — 3700000000 HC ANESTHESIA ATTENDED CARE: Performed by: SURGERY

## 2022-09-30 DEVICE — GRAFT HUM TISS THK2-2.8MM THCK L PERF CNTOUR ACELLULAR DERM: Type: IMPLANTABLE DEVICE | Site: BREAST | Status: FUNCTIONAL

## 2022-09-30 RX ORDER — CLINDAMYCIN PHOSPHATE 600 MG/50ML
600 INJECTION INTRAVENOUS ONCE
Status: COMPLETED | OUTPATIENT
Start: 2022-09-30 | End: 2022-09-30

## 2022-09-30 RX ORDER — APREPITANT 40 MG/1
40 CAPSULE ORAL ONCE
Status: COMPLETED | OUTPATIENT
Start: 2022-09-30 | End: 2022-09-30

## 2022-09-30 RX ORDER — ROCURONIUM BROMIDE 10 MG/ML
INJECTION, SOLUTION INTRAVENOUS PRN
Status: DISCONTINUED | OUTPATIENT
Start: 2022-09-30 | End: 2022-09-30 | Stop reason: SDUPTHER

## 2022-09-30 RX ORDER — CLINDAMYCIN HYDROCHLORIDE 300 MG/1
300 CAPSULE ORAL 3 TIMES DAILY
Qty: 30 CAPSULE | Refills: 0 | Status: SHIPPED | OUTPATIENT
Start: 2022-09-30 | End: 2022-10-10

## 2022-09-30 RX ORDER — SODIUM CHLORIDE, SODIUM LACTATE, POTASSIUM CHLORIDE, CALCIUM CHLORIDE 600; 310; 30; 20 MG/100ML; MG/100ML; MG/100ML; MG/100ML
INJECTION, SOLUTION INTRAVENOUS CONTINUOUS
Status: DISCONTINUED | OUTPATIENT
Start: 2022-09-30 | End: 2022-09-30 | Stop reason: HOSPADM

## 2022-09-30 RX ORDER — LORAZEPAM 2 MG/ML
0.5 INJECTION INTRAMUSCULAR
Status: DISCONTINUED | OUTPATIENT
Start: 2022-09-30 | End: 2022-09-30 | Stop reason: HOSPADM

## 2022-09-30 RX ORDER — OXYCODONE HYDROCHLORIDE 5 MG/1
10 TABLET ORAL PRN
Status: COMPLETED | OUTPATIENT
Start: 2022-09-30 | End: 2022-09-30

## 2022-09-30 RX ORDER — SODIUM CHLORIDE, SODIUM LACTATE, POTASSIUM CHLORIDE, CALCIUM CHLORIDE 600; 310; 30; 20 MG/100ML; MG/100ML; MG/100ML; MG/100ML
INJECTION, SOLUTION INTRAVENOUS CONTINUOUS PRN
Status: DISCONTINUED | OUTPATIENT
Start: 2022-09-30 | End: 2022-09-30 | Stop reason: SDUPTHER

## 2022-09-30 RX ORDER — MIDAZOLAM HYDROCHLORIDE 1 MG/ML
2 INJECTION INTRAMUSCULAR; INTRAVENOUS ONCE
Status: COMPLETED | OUTPATIENT
Start: 2022-09-30 | End: 2022-09-30

## 2022-09-30 RX ORDER — SODIUM CHLORIDE 9 MG/ML
INJECTION, SOLUTION INTRAVENOUS CONTINUOUS
Status: DISCONTINUED | OUTPATIENT
Start: 2022-09-30 | End: 2022-09-30 | Stop reason: HOSPADM

## 2022-09-30 RX ORDER — LIDOCAINE HYDROCHLORIDE 10 MG/ML
1 INJECTION, SOLUTION EPIDURAL; INFILTRATION; INTRACAUDAL; PERINEURAL
Status: DISCONTINUED | OUTPATIENT
Start: 2022-09-30 | End: 2022-09-30 | Stop reason: HOSPADM

## 2022-09-30 RX ORDER — ISOSULFAN BLUE 50 MG/5ML
INJECTION, SOLUTION SUBCUTANEOUS PRN
Status: DISCONTINUED | OUTPATIENT
Start: 2022-09-30 | End: 2022-09-30 | Stop reason: ALTCHOICE

## 2022-09-30 RX ORDER — MAGNESIUM SULFATE HEPTAHYDRATE 500 MG/ML
INJECTION, SOLUTION INTRAMUSCULAR; INTRAVENOUS PRN
Status: DISCONTINUED | OUTPATIENT
Start: 2022-09-30 | End: 2022-09-30 | Stop reason: SDUPTHER

## 2022-09-30 RX ORDER — MEPERIDINE HYDROCHLORIDE 25 MG/ML
12.5 INJECTION INTRAMUSCULAR; INTRAVENOUS; SUBCUTANEOUS EVERY 5 MIN PRN
Status: DISCONTINUED | OUTPATIENT
Start: 2022-09-30 | End: 2022-09-30 | Stop reason: HOSPADM

## 2022-09-30 RX ORDER — PROCHLORPERAZINE EDISYLATE 5 MG/ML
5 INJECTION INTRAMUSCULAR; INTRAVENOUS
Status: DISCONTINUED | OUTPATIENT
Start: 2022-09-30 | End: 2022-09-30 | Stop reason: HOSPADM

## 2022-09-30 RX ORDER — DIPHENHYDRAMINE HYDROCHLORIDE 50 MG/ML
12.5 INJECTION INTRAMUSCULAR; INTRAVENOUS
Status: DISCONTINUED | OUTPATIENT
Start: 2022-09-30 | End: 2022-09-30 | Stop reason: HOSPADM

## 2022-09-30 RX ORDER — LIDOCAINE HYDROCHLORIDE 20 MG/ML
INJECTION, SOLUTION INTRAVENOUS PRN
Status: DISCONTINUED | OUTPATIENT
Start: 2022-09-30 | End: 2022-09-30 | Stop reason: SDUPTHER

## 2022-09-30 RX ORDER — ONDANSETRON 2 MG/ML
INJECTION INTRAMUSCULAR; INTRAVENOUS PRN
Status: DISCONTINUED | OUTPATIENT
Start: 2022-09-30 | End: 2022-09-30 | Stop reason: SDUPTHER

## 2022-09-30 RX ORDER — ONDANSETRON 2 MG/ML
4 INJECTION INTRAMUSCULAR; INTRAVENOUS
Status: COMPLETED | OUTPATIENT
Start: 2022-09-30 | End: 2022-09-30

## 2022-09-30 RX ORDER — SODIUM CHLORIDE 0.9 % (FLUSH) 0.9 %
5-40 SYRINGE (ML) INJECTION PRN
Status: DISCONTINUED | OUTPATIENT
Start: 2022-09-30 | End: 2022-09-30 | Stop reason: HOSPADM

## 2022-09-30 RX ORDER — ESMOLOL HYDROCHLORIDE 10 MG/ML
INJECTION INTRAVENOUS PRN
Status: DISCONTINUED | OUTPATIENT
Start: 2022-09-30 | End: 2022-09-30 | Stop reason: SDUPTHER

## 2022-09-30 RX ORDER — FENTANYL CITRATE 50 UG/ML
INJECTION, SOLUTION INTRAMUSCULAR; INTRAVENOUS PRN
Status: DISCONTINUED | OUTPATIENT
Start: 2022-09-30 | End: 2022-09-30 | Stop reason: SDUPTHER

## 2022-09-30 RX ORDER — SODIUM CHLORIDE 0.9 % (FLUSH) 0.9 %
5-40 SYRINGE (ML) INJECTION EVERY 12 HOURS SCHEDULED
Status: DISCONTINUED | OUTPATIENT
Start: 2022-09-30 | End: 2022-09-30 | Stop reason: HOSPADM

## 2022-09-30 RX ORDER — SODIUM CHLORIDE 9 MG/ML
25 INJECTION, SOLUTION INTRAVENOUS PRN
Status: DISCONTINUED | OUTPATIENT
Start: 2022-09-30 | End: 2022-09-30 | Stop reason: HOSPADM

## 2022-09-30 RX ORDER — LABETALOL HYDROCHLORIDE 5 MG/ML
10 INJECTION, SOLUTION INTRAVENOUS
Status: DISCONTINUED | OUTPATIENT
Start: 2022-09-30 | End: 2022-09-30 | Stop reason: HOSPADM

## 2022-09-30 RX ORDER — CEPHALEXIN 500 MG/1
500 CAPSULE ORAL 4 TIMES DAILY
Qty: 40 CAPSULE | Refills: 0 | Status: CANCELLED | OUTPATIENT
Start: 2022-09-30 | End: 2022-10-10

## 2022-09-30 RX ORDER — SODIUM CHLORIDE 9 MG/ML
INJECTION, SOLUTION INTRAVENOUS PRN
Status: DISCONTINUED | OUTPATIENT
Start: 2022-09-30 | End: 2022-09-30 | Stop reason: HOSPADM

## 2022-09-30 RX ORDER — OXYCODONE HYDROCHLORIDE 5 MG/1
5 TABLET ORAL PRN
Status: COMPLETED | OUTPATIENT
Start: 2022-09-30 | End: 2022-09-30

## 2022-09-30 RX ORDER — EPHEDRINE SULFATE 50 MG/ML
INJECTION INTRAVENOUS PRN
Status: DISCONTINUED | OUTPATIENT
Start: 2022-09-30 | End: 2022-09-30 | Stop reason: SDUPTHER

## 2022-09-30 RX ORDER — SCOLOPAMINE TRANSDERMAL SYSTEM 1 MG/1
1 PATCH, EXTENDED RELEASE TRANSDERMAL
Status: DISCONTINUED | OUTPATIENT
Start: 2022-09-30 | End: 2022-09-30 | Stop reason: HOSPADM

## 2022-09-30 RX ORDER — DOCUSATE SODIUM 100 MG/1
100 CAPSULE, LIQUID FILLED ORAL 2 TIMES DAILY PRN
Qty: 30 CAPSULE | Refills: 0 | Status: SHIPPED | OUTPATIENT
Start: 2022-09-30

## 2022-09-30 RX ORDER — PROPOFOL 10 MG/ML
INJECTION, EMULSION INTRAVENOUS PRN
Status: DISCONTINUED | OUTPATIENT
Start: 2022-09-30 | End: 2022-09-30 | Stop reason: SDUPTHER

## 2022-09-30 RX ORDER — OXYCODONE HYDROCHLORIDE 5 MG/1
5 TABLET ORAL EVERY 6 HOURS PRN
Qty: 28 TABLET | Refills: 0 | Status: SHIPPED | OUTPATIENT
Start: 2022-09-30 | End: 2022-10-07

## 2022-09-30 RX ORDER — MIDAZOLAM HYDROCHLORIDE 1 MG/ML
INJECTION INTRAMUSCULAR; INTRAVENOUS PRN
Status: DISCONTINUED | OUTPATIENT
Start: 2022-09-30 | End: 2022-09-30 | Stop reason: SDUPTHER

## 2022-09-30 RX ORDER — CITRULL/ARGIN/PINE XT/ROSE HIP 400-400 MG
2 TABLET ORAL DAILY
COMMUNITY

## 2022-09-30 RX ORDER — FENTANYL CITRATE 50 UG/ML
25 INJECTION, SOLUTION INTRAMUSCULAR; INTRAVENOUS EVERY 5 MIN PRN
Status: DISCONTINUED | OUTPATIENT
Start: 2022-09-30 | End: 2022-09-30 | Stop reason: HOSPADM

## 2022-09-30 RX ADMIN — ONDANSETRON 4 MG: 2 INJECTION INTRAMUSCULAR; INTRAVENOUS at 11:48

## 2022-09-30 RX ADMIN — OXYCODONE 5 MG: 5 TABLET ORAL at 13:55

## 2022-09-30 RX ADMIN — SUGAMMADEX 200 MG: 100 INJECTION, SOLUTION INTRAVENOUS at 11:44

## 2022-09-30 RX ADMIN — ROCURONIUM BROMIDE 20 MG: 10 INJECTION INTRAVENOUS at 08:40

## 2022-09-30 RX ADMIN — ESMOLOL HYDROCHLORIDE 20 MG: 10 INJECTION, SOLUTION INTRAVENOUS at 12:07

## 2022-09-30 RX ADMIN — ONDANSETRON 4 MG: 2 INJECTION INTRAMUSCULAR; INTRAVENOUS at 13:16

## 2022-09-30 RX ADMIN — CLINDAMYCIN PHOSPHATE 600 MG: 600 INJECTION, SOLUTION INTRAVENOUS at 07:46

## 2022-09-30 RX ADMIN — APREPITANT 40 MG: 40 CAPSULE ORAL at 07:21

## 2022-09-30 RX ADMIN — MIDAZOLAM HYDROCHLORIDE 2 MG: 2 INJECTION, SOLUTION INTRAMUSCULAR; INTRAVENOUS at 07:50

## 2022-09-30 RX ADMIN — SODIUM CHLORIDE, POTASSIUM CHLORIDE, SODIUM LACTATE AND CALCIUM CHLORIDE: 600; 310; 30; 20 INJECTION, SOLUTION INTRAVENOUS at 07:29

## 2022-09-30 RX ADMIN — EPHEDRINE SULFATE 10 MG: 50 INJECTION INTRAVENOUS at 09:24

## 2022-09-30 RX ADMIN — TRANEXAMIC ACID 1000 MG: 1 INJECTION, SOLUTION INTRAVENOUS at 08:07

## 2022-09-30 RX ADMIN — ROCURONIUM BROMIDE 50 MG: 10 INJECTION INTRAVENOUS at 07:50

## 2022-09-30 RX ADMIN — FENTANYL CITRATE 25 MCG: 50 INJECTION, SOLUTION INTRAMUSCULAR; INTRAVENOUS at 13:45

## 2022-09-30 RX ADMIN — FENTANYL CITRATE 100 MCG: 50 INJECTION, SOLUTION INTRAMUSCULAR; INTRAVENOUS at 09:53

## 2022-09-30 RX ADMIN — SODIUM CHLORIDE, SODIUM LACTATE, POTASSIUM CHLORIDE, AND CALCIUM CHLORIDE: .6; .31; .03; .02 INJECTION, SOLUTION INTRAVENOUS at 12:13

## 2022-09-30 RX ADMIN — MIDAZOLAM 2 MG: 1 INJECTION INTRAMUSCULAR; INTRAVENOUS at 07:28

## 2022-09-30 RX ADMIN — FENTANYL CITRATE 25 MCG: 50 INJECTION, SOLUTION INTRAMUSCULAR; INTRAVENOUS at 13:30

## 2022-09-30 RX ADMIN — PROPOFOL 100 MG: 10 INJECTION, EMULSION INTRAVENOUS at 12:03

## 2022-09-30 RX ADMIN — EPHEDRINE SULFATE 15 MG: 50 INJECTION INTRAVENOUS at 08:35

## 2022-09-30 RX ADMIN — SODIUM CHLORIDE, SODIUM LACTATE, POTASSIUM CHLORIDE, AND CALCIUM CHLORIDE: .6; .31; .03; .02 INJECTION, SOLUTION INTRAVENOUS at 07:46

## 2022-09-30 RX ADMIN — SODIUM CHLORIDE, SODIUM LACTATE, POTASSIUM CHLORIDE, AND CALCIUM CHLORIDE: .6; .31; .03; .02 INJECTION, SOLUTION INTRAVENOUS at 08:55

## 2022-09-30 RX ADMIN — ROCURONIUM BROMIDE 30 MG: 10 INJECTION INTRAVENOUS at 09:48

## 2022-09-30 RX ADMIN — FENTANYL CITRATE 50 MCG: 50 INJECTION, SOLUTION INTRAMUSCULAR; INTRAVENOUS at 08:06

## 2022-09-30 RX ADMIN — LIDOCAINE HYDROCHLORIDE 60 MG: 20 INJECTION, SOLUTION INTRAVENOUS at 07:50

## 2022-09-30 RX ADMIN — Medication 900 MICRO CURIE: at 08:00

## 2022-09-30 RX ADMIN — FENTANYL CITRATE 100 MCG: 50 INJECTION, SOLUTION INTRAMUSCULAR; INTRAVENOUS at 07:50

## 2022-09-30 RX ADMIN — MAGNESIUM SULFATE HEPTAHYDRATE 2 G: 500 INJECTION, SOLUTION INTRAMUSCULAR; INTRAVENOUS at 09:04

## 2022-09-30 RX ADMIN — HYDROMORPHONE HYDROCHLORIDE 0.5 MG: 1 INJECTION, SOLUTION INTRAMUSCULAR; INTRAVENOUS; SUBCUTANEOUS at 13:15

## 2022-09-30 RX ADMIN — PROPOFOL 200 MG: 10 INJECTION, EMULSION INTRAVENOUS at 07:50

## 2022-09-30 ASSESSMENT — PAIN DESCRIPTION - PAIN TYPE
TYPE: SURGICAL PAIN

## 2022-09-30 ASSESSMENT — PAIN DESCRIPTION - ONSET
ONSET: PROGRESSIVE
ONSET: PROGRESSIVE

## 2022-09-30 ASSESSMENT — PAIN SCALES - GENERAL
PAINLEVEL_OUTOF10: 4
PAINLEVEL_OUTOF10: 9
PAINLEVEL_OUTOF10: 4
PAINLEVEL_OUTOF10: 5
PAINLEVEL_OUTOF10: 4

## 2022-09-30 ASSESSMENT — PAIN DESCRIPTION - LOCATION
LOCATION: BREAST

## 2022-09-30 ASSESSMENT — PAIN DESCRIPTION - DESCRIPTORS
DESCRIPTORS: BURNING
DESCRIPTORS: BURNING

## 2022-09-30 ASSESSMENT — PAIN DESCRIPTION - ORIENTATION
ORIENTATION: RIGHT;LEFT;OUTER
ORIENTATION: RIGHT;LEFT
ORIENTATION: RIGHT;LEFT;OUTER

## 2022-09-30 ASSESSMENT — PAIN - FUNCTIONAL ASSESSMENT
PAIN_FUNCTIONAL_ASSESSMENT: 0-10
PAIN_FUNCTIONAL_ASSESSMENT: PREVENTS OR INTERFERES SOME ACTIVE ACTIVITIES AND ADLS

## 2022-09-30 ASSESSMENT — PAIN DESCRIPTION - FREQUENCY
FREQUENCY: CONTINUOUS
FREQUENCY: CONTINUOUS

## 2022-09-30 NOTE — BRIEF OP NOTE
Brief Postoperative Note      Patient: Reinier Chin  YOB: 1975  MRN: [id number]    Date of Procedure: 9/30/2022    Pre-Op Diagnosis: Malignant neoplasm of lower-outer quadrant of left breast of female, estrogen receptor positive ([facility] [C50.512, Z17.0] Infiltrating duct and lobular carcinoma, unspecified laterality ([facility] [C50.919]    Post-Op Diagnosis: Same       Procedure(s):  LEFT BREAST MODIFIED RADICAL MASTECTOMY, LEFT BREAST SENTINEL NODE BIOPSY, AXILLARY LYMPH NODE DISSECTION WITH [address];  RIGHT BREAST SIMPLE MASTECTOMY;  BILATERAL BREAST RECONSTRUCTION WITH STAGE 1 TISSUE EXPANDER PLACEMENT WITH ALLODERM AND CREATION OF AUTODERM FLAPS    Surgeon(s):  Arleen Reed MD Coolidge Canes, MD Coolidge Canes, MD Coolidge Canes, MD    Assistant:  Surgical Assistant: Hortencia Anderson; Hector Correia  Resident: Sam Vela MD    Anesthesia: General    Estimated Blood Loss (mL): 520     Complications: Bleeding    Specimens:   ID Type Source Tests Collected by Time Destination   A : A. RIGHT  Breast Tissue Breast [address] [place] [address], DO 9/30/2022 1051    B : B. LEFT Breast Tissue Breast SURGICAL PATHOLOGY [facility], DO 9/30/2022 1045    C : C. Level 1 lymph node Left Axilla Tissue Tissue SURGICAL PATHOLOGY [facility], DO 9/30/2022 1054    D : LEVEL 1 LEFT AXILLARY LYMPH NODE Tissue Tissue SURGICAL PATHOLOGY [facility], DO 9/30/2022 1100    E : E. [place] lymph node #1 hot blue [id number] Tissue Tissue SURGICAL PATHOLOGY [facility], DO 9/30/2022 1102    F : LEFT AXILLARY LYMPH NODE #3 LEVEL 1 Tissue Tissue SURGICAL PATHOLOGY [facility], DO 9/30/2022 1151        Implants:  Implant Name Type Inv.  Item Serial No.  Lot No. LRB No. Used Action   GRAFT HUM TISS THK2-2.8MM THCK L PERF CNTOUR ACELLULAR DERM - [id number] Other GRAFT HUM TISS THK2-2.8MM [address] [employer] [id number] Right 1 Implanted   GRAFT HUM TISS THK2-2.8MM [facility] - [id number] Other GRAFT HUM TISS THK2-2.8MM [address] ACELLULAR DERM  [facility] [employer] [id number] Right 1 Implanted   GRAFT HUM TISS THK2-2.8MM [facility] - [id number] Other GRAFT HUM TISS THK2-2.8MM [address] L PERF CNTOUR ACELLULAR DERM  [employer] [id number] Left 1 Implanted   GRAFT HUM TISS THK2-2.8MM [address] L PERF CNTOUR ACELLULAR DERM - [id number] Other GRAFT HUM TISS THK2-2.8MM 350 [facility] [email or web address] [id number] Left 1 Implanted         Drains:   Urinary Catheter 09/30/22 Birch (Active)       Findings: [place] lymph node times one negative for carcinoma. 4 lymph nodes sent for permanent pathology. Right mastectomy by Dr. Mcduffie January.   Left mastectomy and left lymph node biopsies by Dr. Blood Draft    Electronically signed by Judi Hurley DO on 9/30/2022 at 12:28 PM

## 2022-09-30 NOTE — H&P
[address]    [id number]    [facility] [employer] Same Day Surgery Update H & P  Department of General Surgery   Surgical Service   Pre-operative History and Physical  Last H & P within the last 30 days. DIAGNOSIS:   Malignant neoplasm of lower-outer quadrant of left breast of female, estrogen receptor positive (HCC) [C50.512, Z17.0]  Infiltrating duct and lobular carcinoma, unspecified laterality ([facility] [address] [address] [C50.919]  BRCA positive [Z15.01, Z15.09]    Procedure(s):  LEFT BREAST MODIFIED RADICAL MASTECTOMY, LEFT BREAST SENTINEL NODE BIOPSY, POSSIBLE AXILLARY LYMPH NODE DISSECTION WITH LYMPHOSEEK AND LYMPHAZURIN;  RIGHT BREAST SIMPLE MASTECTOMY;  BILATERAL BREAST RECONSTRUCTION WITH STAGE 1 TISSUE EXPANDER PLACEMENT WITH ALLODERM AND CREATION OF AUTODERM FLAPS    History obtained from: Patient interview and EHR      HISTORY OF PRESENT ILLNESS:   The patient is a 40 y.o. female with self detected left breast mass. Patient underwent imaging and ultimately core biopsy which demonstrated Invasive carcinoma (lobular/ductal with lobular features). The patient presents today for the above procedures with Aman Quinteros and Sancho Rios. Illness Screening: Patient denies fever, chills, worsening cough, or close contact with sick individuals.         Past Medical History:        Diagnosis Date    Anxiety     Cancer of overlapping sites of left breast ([facility] [address] 03/15/2022    Sleep difficulties     Wears glasses      Past Surgical History:        Procedure Laterality Date    PORT SURGERY N/A 4/5/2022    Attempted RIGHT subclavian, Right IJ PORT A CATHETER INSERTION, ultrasound guidance performed by Rakesh Franco, DO at [address] [place] [address] Right 4/5/2022    REPOSITION OF RIGHT PORT INSERTION performed by Cameron DO Joan at [place] [address] LEFT Left 3/11/2022     BREAST NEEDLE BIOPSY LEFT 3/11/2022 Desmond Ruff MD [address] LYMPH NODE BIOPSY  3/11/2022     LYMPH NODE BIOPSY 3/11/2022 Kerry Brady  [facility] EXTRACTION  1999       Medications Prior to Admission:      Prior to Admission medications    Medication Sig Start Date End Date Taking? Authorizing Provider   mupirocin (BACTROBAN NASAL) 2 % nasal ointment Take by Nasal route 2 times daily for 5 days prior to surgery. 9/9/22   FRANC Dubose - CNP   Probiotic Product (RA PROBIOTIC GUMMIES PO) Take by mouth    Historical Provider, MD   vitamin C (ASCORBIC ACID) 500 MG tablet Take 500 mg by mouth 2 times daily    Historical Provider, MD   vitamin B-12 (CYANOCOBALAMIN) 100 MCG tablet Take 50 mcg by mouth daily    Historical Provider, MD   traZODone (DESYREL) 50 MG tablet Take 50 mg by mouth nightly    Historical Provider, MD   sertraline (ZOLOFT) 50 MG tablet Take 50 mg by mouth daily    Historical Provider, MD   Cholecalciferol (VITAMIN D) 50 MCG (2000 UT) CAPS capsule Take by mouth    Historical Provider, MD   esomeprazole (NEXIUM) 40 MG delayed release capsule Take 40 mg by mouth every morning (before breakfast) 9/1/21   Historical Provider, MD   Melatonin 10 MG TABS Take 1 tablet by mouth    Historical Provider, MD         Allergies:  Erythromycin, Fluticasone propionate, Penicillins, and Zyrtec [cetirizine]    PHYSICAL EXAM:      /85   Pulse 83   Temp 96.8 °F (36 °C) (Temporal)   Resp 15   Ht 5' 4\" (1.626 m)   Wt 194 lb 9.6 oz (88.3 kg)   SpO2 97%   BMI 33.40 kg/m²      Airway:  Airway patent with no audible stridor    Heart:  Regular rate and rhythm, No murmur noted    Lungs:  No increased work of breathing, good air exchange, clear to auscultation bilaterally, no crackles or wheezing    Abdomen:  Soft, non-distended, non-tender, no rebound tenderness or guarding, and no masses palpated    ASSESSMENT AND PLAN     Patient is a 40 y.o. female with above specified procedure planned.     1.  The patients history and physical was obtained and signed off by the pre-admission testing department. Patient seen and focused exam done today- no new changes since last physical exam on 9/19/22    2. Access to ancillary services are available per request of the provider.     FRANC Oconnor - CNP     9/30/2022

## 2022-09-30 NOTE — ANESTHESIA POSTPROCEDURE EVALUATION
Department of Anesthesiology  Postprocedure Note    Patient: Delaney Friday  MRN: [id number]  YOB: 1975  Date of evaluation: 9/30/2022      Procedure Summary     Date: 09/30/22 Room / Location: [facility] 01 / [facility]    Anesthesia Start: 5576 Anesthesia Stop: 5962    Procedures:       LEFT BREAST MODIFIED RADICAL MASTECTOMY, LEFT BREAST SENTINEL NODE BIOPSY, AXILLARY LYMPH NODE DISSECTION WITH [address] [facility] [address]; (Left: Breast)      RIGHT BREAST SIMPLE MASTECTOMY; (Right: Breast)      BILATERAL BREAST RECONSTRUCTION WITH STAGE 1 TISSUE EXPANDER PLACEMENT WITH ALLODERM AND CREATION OF AUTODERM FLAPS (Bilateral) Diagnosis:       Malignant neoplasm of lower-outer quadrant of left breast of female, estrogen receptor positive ([address]      Infiltrating duct and lobular carcinoma, unspecified laterality ([address]      BRCA positive      (Malignant neoplasm of lower-outer quadrant of left breast of female, estrogen receptor positive ([address] [C50.512, Z17.0] Infiltrating duct and lobular carcinoma, unspecified laterality ([address] Rachel Martinez)    Surgeons: Bowen Santos DO; Umer Lorenz MD Responsible Provider: Jesse Ross MD    Anesthesia Type: general ASA Status: 3          Anesthesia Type: No value filed.     Holli Phase I: Holli Score: 10    Holli Phase II:        Anesthesia Post Evaluation    Patient location during evaluation: PACU  Level of consciousness: awake  Complications: no  Multimodal analgesia pain management approach

## 2022-09-30 NOTE — PROGRESS NOTES
Ambulatory Surgery/Procedure Discharge Note    Vitals:    09/30/22 1418   BP: 115/73   Pulse: 84   Resp: 16   Temp: (!) 96.1 °F (35.6 °C)   SpO2: 95%   Pt meets discharge criteria per Holli score. In: 2397 [P.O.:222; I.V.:2175]  Out: 705 [Urine:420; Drains:35]    Restroom use offered before discharge. Yes    Pain assessment:  present - adequately treated  Pain Level: 4    Pt and S.O./family states \"ready to go home\". Pt alert and oriented x4. IV removed. Mesh dressing to bilateral breast incisions c/d/I. Fluff gauze, foam and NOAM drains x2 c/d/I. Drains draining without difficulty. Discharge instructions given to pt, wife and mother with pt permission. Pt, wife, and mother verbalized understanding of all instructions. Left with all belongings,3 prescriptions, and discharge instructions. Patient discharged to home/self care.  Patient discharged via wheel chair by transporter to waiting family/S.O.       9/30/2022 3:27 PM

## 2022-09-30 NOTE — PROGRESS NOTES
PACU Transfer to \[facility]\""  #4    Procedure(s):  LEFT BREAST MODIFIED RADICAL MASTECTOMY, LEFT BREAST SENTINEL NODE BIOPSY, AXILLARY LYMPH NODE DISSECTION WITH LYMPHOSEEK AND LYMPHAZURIN;  RIGHT BREAST SIMPLE MASTECTOMY;  BILATERAL BREAST RECONSTRUCTION WITH STAGE 1 TISSUE EXPANDER PLACEMENT WITH ALLODERM AND CREATION OF AUTODERM FLAPS    Pt's Current Allergies: Erythromycin, Fluticasone propionate, Penicillins, and Zyrtec [cetirizine]    Pt meets criteria to transfer to next phase of care per [facility] and [facility] standards    No results for input(s): POCGLU in the last 72 hours. Vitals:    09/30/22 1400   BP: 104/64   Pulse: 87   Resp: 15   Temp: 98.9 °F (37.2 °C)   SpO2: 97%          Intake/Output Summary (Last 24 hours) at 9/30/2022 1412  Last data filed at 9/30/2022 1345  Gross per 24 hour   Intake 2397 ml   Output 670 ml   Net 1727 ml     Drank soda   Ate crackers    Pain assessment:  receiving treatment  Pain Level: 4    Roxicodone 5  mg PO  Was given for trip home    Patient was assessed for unknown alterations to skin integrity. There were not unknown alterations observed. NOAM drain containers sent with Patient   Instructions on emptying tubes and record paper sent       Patient transferred to care of \[facility]\"" RN.  Via handoff sheet  Family updated and directed to \[facility]\""  via waiting room staff    9/30/2022 2:12 PM

## 2022-09-30 NOTE — PROGRESS NOTES
Panel 1  Log ID: [id number]  General  LEFT BREAST MODIFIED RADICAL MASTECTOMY, LEFT BREAST SENTINEL NODE BIOPSY, AXILLARY LYMPH NODE DISSECTION WITH LYMPHOSEEK AND LYMPHAZURIN; - Left  General  Dr Asim Juarez; - Right, General  Dr Rosario Party  Panel 2  Plastics/Reconstructive  BILATERAL BREAST RECONSTRUCTION WITH STAGE 1 TISSUE EXPANDER PLACEMENT WITH ALLODERM AND CREATION OF AUTODERM FLAPS - Bilateral  Dr Sophie Rodriguez    Current Allergies: Erythromycin, Fluticasone propionate, Penicillins, and Zyrtec [cetirizine]    No results for input(s): POCGLU in the last 72 hours. Admitted to PACU bed 10 from OR. Arrived on a stretcher . Attached to PACU monitoring system. Alarms and parameters set. Report received from anesthesia personnel [id number] staff did not report skin issues that were observed while in OR  No problems reported intraoperatively. Pt arrived with oxygen per nasal cannula with oxygen at 6 liters. Athrombic wrap on the right leg removed for discharge  Patient came out with jaimes still in, Dr Sofy Bergman at bedside stated to discontinue it  NOAM drains bilateral are heavy emptied and recorded both  Collection of supplies to go home with     Doctors aware of all labs before coming to recovery.

## 2022-09-30 NOTE — ANESTHESIA PRE PROCEDURE
Department of Anesthesiology  Preprocedure Note       Name:  Buzz Kumar   Age:  40 y.o.  :  1975                                          MRN:  [id number]         Date:  2022      Surgeon: Burt Amezquita):  Jared Church, DO Nichole Essex, MD Sherryl Cleaves, MD    Procedure: Procedure(s):  LEFT BREAST MODIFIED RADICAL MASTECTOMY, LEFT BREAST SENTINEL NODE BIOPSY, POSSIBLE AXILLARY LYMPH NODE DISSECTION WITH [address] AND LYMPHAZURIN;  RIGHT BREAST SIMPLE MASTECTOMY;  BILATERAL BREAST RECONSTRUCTION WITH STAGE 1 TISSUE EXPANDER PLACEMENT WITH ALLODERM AND CREATION OF AUTODERM FLAPS    Medications prior to admission:   Prior to Admission medications    Medication Sig Start Date End Date Taking? Authorizing Provider   mupirocin (BACTROBAN NASAL) 2 % nasal ointment Take by Nasal route 2 times daily for 5 days prior to surgery. 22   FRANC Shanks - CNP   Probiotic Product (RA PROBIOTIC GUMMIES PO) Take by mouth    Historical Provider, MD   vitamin C (ASCORBIC ACID) 500 MG tablet Take 500 mg by mouth 2 times daily    Historical Provider, MD   vitamin B-12 (CYANOCOBALAMIN) 100 MCG tablet Take 50 mcg by mouth daily    Historical Provider, MD   traZODone (DESYREL) 50 MG tablet Take 50 mg by mouth nightly    Historical Provider, MD   sertraline (ZOLOFT) 50 MG tablet Take 50 mg by mouth daily    Historical Provider, MD   Cholecalciferol (VITAMIN D) 50 MCG (2000) CAPS capsule Take by mouth    Historical Provider, MD   esomeprazole (NEXIUM) 40 MG delayed release capsule Take 40 mg by mouth every morning (before breakfast) 21   Historical Provider, MD   Melatonin 10 MG TABS Take 1 tablet by mouth    Historical Provider, MD       Current medications:    No current facility-administered medications for this visit. No current outpatient medications on file.      Facility-Administered Medications Ordered in Other Visits   Medication Dose Route Frequency Provider Last Rate Last Admin    tranexamic acid (CYKLOKAPRON) 1,000 mg in sodium chloride 0.9 % 50 mL IVPB  1,000 mg IntraVENous Once Susy Main MD        clindamycin (CLEOCIN) 600 mg in dextrose 5 % 50 mL IVPB  600 mg IntraVENous Once Susy Main MD        lidocaine PF 1 % injection 1 mL  1 mL IntraDERmal Once PRN Caroline Keith MD        lactated ringers infusion   IntraVENous Continuous Caroline Keith MD        sodium chloride flush 0.9 % injection 5-40 mL  5-40 mL IntraVENous 2 times per day Caroline Keith MD        sodium chloride flush 0.9 % injection 5-40 mL  5-40 mL IntraVENous PRN Caroline Keith MD        0.9 % sodium chloride infusion   IntraVENous PRN Caroline Keith MD           Allergies:     Allergies   Allergen Reactions    Erythromycin Other (See Comments)     Upset stomach    Fluticasone Propionate Dizziness or Vertigo    Penicillins Hives    Zyrtec [Cetirizine] Palpitations       Problem List:    Patient Active Problem List   Diagnosis Code    Sleep difficulties G47.9    Cancer of overlapping sites of left breast ([address] C50.812       Past Medical History:        Diagnosis Date    Anxiety     Cancer of overlapping sites of left breast ([address] 03/15/2022    Sleep difficulties     Wears glasses        Past Surgical History:        Procedure Laterality Date    PORT SURGERY N/A 4/5/2022    Attempted RIGHT subclavian, Right IJ PORT A CATHETER INSERTION, ultrasound guidance performed by Fátima Irizarry DO at [facility] Right 4/5/2022    REPOSITION OF RIGHT PORT INSERTION performed by Fátima Irizarry DO at [place] [id number] LEFT Left 3/11/2022    US BREAST NEEDLE BIOPSY LEFT 3/11/2022 Niles Allison MD [facility]TR[facility]    US LYMPH NODE BIOPSY  3/11/2022    US LYMPH NODE BIOPSY 3/11/2022 Niles Allison MD [facility]TR[facility]    WISDOM TOOTH EXTRACTION  1999       Social History:    Social History     Tobacco Use    Smoking status: Screen (If Applicable):  No results found for: LABABO, LABRH    Drug/Infectious Status (If Applicable):  No results found for: HIV, HEPCAB    COVID-19 Screening (If Applicable): No results found for: COVID19        Anesthesia Evaluation    Airway: Mallampati: II  TM distance: >3 FB   Neck ROM: full  Mouth opening: > = 3 FB   Dental:          Pulmonary:                              Cardiovascular:            Rhythm: regular  Rate: normal                    Neuro/Psych:               GI/Hepatic/Renal:             Endo/Other:    (+) malignancy/cancer. Abdominal:             Vascular: Other Findings:             Anesthesia Plan      general     ASA 3       Induction: intravenous. Anesthetic plan and risks discussed with patient. Plan discussed with CRNA.                     Marielle Zuñiga MD   9/30/2022

## 2022-09-30 NOTE — BRIEF OP NOTE
Brief Postoperative Note      Patient: Burt Altamirano  YOB: 1975  MRN: [id number]    Date of Procedure: 9/30/2022    Pre-Op Diagnosis: Malignant neoplasm of lower-outer quadrant of left breast of female, estrogen receptor positive ([facility] [C50.512, Z17.0] Infiltrating duct and lobular carcinoma, unspecified laterality ([facility] [C50.919]    Post-Op Diagnosis: Same       Procedure(s):  LEFT BREAST MODIFIED RADICAL MASTECTOMY, LEFT BREAST SENTINEL NODE BIOPSY, POSSIBLE AXILLARY LYMPH NODE DISSECTION WITH [id number] [facility];  RIGHT BREAST SIMPLE MASTECTOMY;  BILATERAL BREAST RECONSTRUCTION WITH STAGE 1 TISSUE EXPANDER PLACEMENT WITH ALLODERM AND CREATION OF AUTODERM FLAPS    Surgeon(s):  Nori Irving MD Clerance Guy, MD Clerance Guy, MD Clerance Guy, MD    Assistant:  Surgical Assistant: Mercedes Guzmán  Resident: Ramez Quezada MD    Anesthesia: General    Estimated Blood Loss (mL):  911RX    Complications: None    Specimens:   ID Type Source Tests Collected by Time Destination   A : A. RIGHT  Breast Tissue Breast [address] [place] [address],  9/30/2022 5883    B : B. LEFT Breast Tissue Breast SURGICAL PATHOLOGY [facility],  9/30/2022 1045    C : C. Level 1 lymph node Left Axilla Tissue Tissue SURGICAL PATHOLOGY [facility],  9/30/2022 1054    D : LEVEL 1 LEFT AXILLARY LYMPH NODE Tissue Tissue SURGICAL PATHOLOGY [facility],  9/30/2022 1100    E : E. [place] lymph node #1 hot blue [id number] Tissue Tissue SURGICAL PATHOLOGY [facility],  9/30/2022 1102    F : LEFT AXILLARY LYMPH NODE #3 LEVEL 1 Tissue Tissue SURGICAL PATHOLOGY [facility],  9/30/2022 1151        Implants:  Implant Name Type Inv.  Item Serial No.  Lot No. LRB No. Used Action   GRAFT HUM TISS THK2-2.8MM THCK L PERF CNTOUR ACELLULAR DERM - [id number] Other GRAFT HUM TISS [facility] [employer] [address] L PERF CNTOUR ACELLULAR DERM  [employer] [facility] [id number] Right 1 Implanted   GRAFT HUM TISS THK2-2.8MM [address] L PERF CNTOUR ACELLULAR DERM - [id number] Other GRAFT HUM TISS THK2-2.8MM [address] L PERF CNTOUR ACELLULAR DERM  [employer] [facility] [id number] Right 1 Implanted   GRAFT HUM TISS THK2-2.8MM [address] L PERF CNTOUR ACELLULAR DERM - [id number] Other GRAFT HUM TISS THK2-2.8MM [address] L PERF CNTOUR ACELLULAR DERM  [facility] [employer] [facility] [id number] Left 1 Implanted   GRAFT HUM TISS THK2-2.8MM [address] L PERF CNTOUR ACELLULAR DERM - [id number] Other GRAFT HUM TISS THK2-2.8MM [address] [employer]WD [id number] Left 1 Implanted         Drains:   Urinary Catheter 09/30/22 Birch (Active)       Findings: Left breast modified radical mastectomy with left sentinel node biopsy with Dr. Bharat Phipps; Right breast simple mastectomy with Dr. Gal Keyes; Bilateral breast reconstruction with Stage 1 tissue expander placement with alloderm and creation of autoderm flaps with Dr. Gay Cyr.          Electronically signed by Tierney Purvis MD on 9/30/2022 at 12:11 PM

## 2022-09-30 NOTE — DISCHARGE INSTRUCTIONS
Discharge Instructions:    Diet:   You may resume a regular diet. Wound Care:   Skin glue with mesh was used to cover your incisions. . It will fall off on its own in about 10 days. You may shower, but do not scrub the incision sites directly or soak (tub, pool, etc.). Activity:   No heavy lifting greater than a milk jug until follow up. Pain management:   Unless informed of any restrictions by your primary care physician, please use your preferred over-the-counter pain reliever as your primary pain medication. If you have pain that persists despite over-the-counter pain medications, you have been provided with a prescription for an opioid/narcotic pain reliever (Roxicodone). No driving or operating machinery while taking opioid/narcotic medications. Bowel Regimen:   Opioid/Narcotic pain relievers have a common side effect of constipation; therefore, you have been provided with a prescription for a stool softener, Docusate (Colace). These medications are intended to help prevent you from experiencing this very common side effect and also help to regulate your bowels after surgery. If your stools become too loose and/or frequent, decrease the Colace to one pill one time each day. If your stools are still loose after this modification, stop taking this medication all together. Return Precautions:   Call/ Return to ED for increased redness, worsening pain, drainage from wound, fevers, or any other concerns about your incision or post op course. Follow up with Dr. Isatu Byrd in 1 week. Please call [phone] to schedule your appointment.   Panel 1  Log ID: [id number]  General Dr Iggy Johnson  LEFT BREAST MODIFIED RADICAL MASTECTOMY, LEFT BREAST SENTINEL NODE BIOPSY, AXILLARY LYMPH NODE DISSECTION WITH LYMPHOSEEK AND LYMPHAZURIN; - Left  General  RIGHT BREAST SIMPLE MASTECTOMY; - Right, General  Panel 2  Dr García Herbert 1 [facility]. ALLODERM AND CREATION OF AUTODERM FLAPS - Bilateral  Dr Cheli Asif    9/30/2022      [facility]    There are potential side effects of anesthesia or sedation you may experience for the first 24 hours. These side effects include:    Confusion or Memory loss, Dizziness, or Delayed Reaction Times   [x]A responsible person should be with you for the next 24 hours. Do not operate any vehicles (automobiles, bicycles, motorcycles) or power tools or machinery for 24 hours. Do not sign any legal documents or make any legal decisions for 24 hours. Do not drink alcohol for 24 hours or while taking narcotic pain medication. Nausea/Diet  [x] Nausea is not uncommon after having general anesthesia. Start with light diet and progress to your normal diet as you feel like eating. However, if you experience nausea or repeated episodes of vomiting which persist beyond 12-24 hours, notify your physician. Once nausea has passed, remember to keep drinking fluids. Difficulty Passing Urine  [x]Drink extra amounts of fluid today. Notify your physician if you have not urinated within 8 hours after your procedure or you feel uncomfortable. Irritated Throat from a Breathing Tube  [x]Drink extra amounts of fluid today. Lozenges may help. Muscle Aches  [x]You may experience some generalized body aches as your muscles recover from medications used to relax them during surgery. These will gradually subside. ACTIVITY INSTRUCTIONS:  [x]Rest today. Increase activity as tolerated. [x]No heavy lifting or strenuous activity  []No driving until cleared by your surgeon  []No driving while on narcotic pain medications or for 24 hours. Otherwise, you can drive when you can sit comfortably behind the steering wheel and can slam on the brake without it hurting or turn the wheel sharply without it hurting.  Practice this while sitting in the car with it parked in your driveway before trying to drive. POST OPERATION WOUND CARE INSTRUCTIONS:       Follow instructions as instructed verbally and written by your Surgeon. Call Doctor for any questions or concerns at their office number. Someone will answer the phone 24hrs/7 days a week. MEDICATION INSTRUCTIONS:  Prescription(S) x  3  were sent with you. Use as directed. Refer to your pharmacy's medication information sheets or your pharmacist for any questions you may have about the medicines prescribed. When taking pain medications, you may experience the side effect of dizziness or drowsiness. Do not drink alcohol or drive when taking these medications. If no prescriptions were sent home with you, you may take over the counter medications as needed for your discomfort unless your doctor directs differently    [x] You may resume all medications you were taking before surgery  [x] Give the list of your medications to your primary care physician on your next visit. Keep your med list updated and carry it with in case of emergencies. [x] If you take any blood thinning medication, such as Coumadin or Plavix, check with your surgeon on when to re-start taking it. [x] Narcotic pain medications can cause significant constipation. You may want to add a stool softener to your postoperative medication schedule or speak to your surgeon on how best to manage this side effect. NARCOTIC SAFETY:  Your pain medicine is only for you to take. Safely store your medicines. Store pills up high and out of reach of children and pets. Ensure safety caps are snapped tightly  Keep track of how many pills you have left    Unused medication can be disposed of by taking them to a drop-off box or take-back program that is authorized by the [facility]. Access to a site near you can be found on the [facility] Diversion Control Division website ([address] [facility].gov).     If you have a CPAP machine, it is very important that you use it daily during all periods of sleep and daytime rest during your recovery at home. Surgery and Anesthesia place a significant amount of stress on your body. Using your CPAP will help keep you safe and lessen the negative effects of that stress. If you smoke STOP. Smoking can interfere with healing and your respiratory health after surgery. We care about your health! Watch for these significant complications. Call physician if they or any other problems occur:  Fever over 101 F°    Redness, swelling, hardness or warmth at the operative site  Unrelieved nausea    Foul smelling or cloudy drainage at the operative site   Unrelieved pain after taking medications as prescribed by your doctor    Blood soaked dressing. (Some oozing may be normal)  Numb, pale, blue, cold or tingling extremity  You have prolonged anesthesia/sedation side effects          FAQs  (frequently asked questions)  About Surgical Site Infections    What is a Surgical Site Infection (SSI)? A surgical site infection is an infection that occurs after surgery in the part of the body where the surgery took place. Most patients who have surgery do not develop an infection. However, infections develop in about 1 to 3 out of every 100 patients who have surgery. Some common symptoms of a surgical site infection are:   Redness and pain around the area where you had surgery  Drainage of cloudy fluid from your surgical wound   Fever    Can SSIs be treated? Yes. Most surgical site infections can be treated with antibiotics. The antibiotic given to you depends on the bacteria (germs) causing the infection. Sometimes patients with SSIs also need another surgery to treat the infection. What are some of the things that hospitals are doing to prevent SSIs? To prevent SSIs, doctors, nurses and other healthcare providers:   Clean their hands and arms up to their elbows with an antiseptic agent just before the surgery.    Clean their hands with soap and water or an alcohol-based hand rub before and after caring for each patient. May remove some of your hair immediately before your surgery using electric clippers if the hair is in the same area where the procedure will occur. They should not shave you with a razor. Wear special hair covers, masks, gowns, and gloves during surgery to keep the surgery area clean. Give you antibiotics before your surgery starts. In most cases, you should get antibiotics within 60 minutes before the surgery starts and the antibiotics should be stopped within 24 hours after surgery. Clean the skin at the site of your surgery with a special soap that kills germs. What can I do to help prevent SSIs? Before you surgery:  Tell your doctor about other medical problems you may have. Health problems such as allergies, diabetes, and obesity could affect your surgery and your treatment. Quit smoking. Patients who smoke get more infections. Talk to your doctor about how you can quit before your surgery. Do not shave near where you will have surgery. Shaving with a razor can irritate your skin and make it easier to develop an infection. At the time of your surgery:  Speak up if someone tries to shave you with a razor before surgery. Ask why you need to be shaved and talk with your surgeon if you have any concerns. Ask if you will get antibiotics before surgery. After your surgery:  Make sure that your healthcare providers clean their hands before examining you, either with soap and water or an alcohol-based hand rub. IF YOU DO NOT SEE YOUR PROVIDERS CLEAN THEIR HANDS, PLEASE ASK THEM TO DO SO. Family and friends who visit you should not touch the surgical wound or dressings. Family and friends should clean their hands with soap and water or an alcohol-based hand rub before and after visiting you. If you do not see them clean their hands, ask them to clean their hands.      What do I need to do when I go home from the hospital?  Before you go home, your doctor nurses should explain everything you need to know about taking care of your wound. Make sure you understand how to care for your wound before you leave the hospital.    Always clean your hands before and after caring for your wound. Before you go home, make sure you know who to contact if you have questions or problems after you get home. If you have any symptoms of an infection, such as redness and pain at the surgery site, drainage, or fever, call your doctor immediately. If you have additional questions, please ask your doctor or nurse. Your Surgeon or Anesthesiologist gave you Exparel     Exparel (bupivicaine liposome injectable suspension) is a medication injected into the surgical incision  just before the end of the procedure by your surgeon to help control your pain after surgery. It can also be given as a nerve block by the anesthesiologist. This local anesthetic provides pain relief by numbing the tissue around the surgical site. Exparel releases pain medication over time lasting up to 5 days or 120 hours. Exparel may cause a temporary loss of sensation or the ability to move in the area where the medication was injected. Side effects of Exparel that may occur include nausea, vomiting or constipation. Call immediately if you experience numbness or tingling of the mouth or lips, lightheadedness or severe anxiety. Notify your physician if you experience these or any other possible side effects of the medication. Note: Other forms of bupivacaine should not be administered within 96 hours (4 days) following administration of Exparel. Please keep ID band in place for 96 hours (4 days).

## 2022-09-30 NOTE — PROGRESS NOTES
Pt is alert and oriented X4. She is very anxious and has a phobia of needles. IV versed was given after IV was placed. All doctors saw and marked the patient before versed. Clindamycin on hold to OR. Scop patch and emend given Pre-Op. Consents signed and on chart. Labs were unable to be drawn from the IV. Dr. Hoffman Rednubia aware and said they could be drawn in the OR. Wife Mahi Santamaria updated and all questions were answered.

## 2022-09-30 NOTE — OP NOTE
[place] [address] SURGERY     OPERATIVE DICTATION    NAME: Alfreda Campos   MRN: [id number]  DATE: 9/30/2022     AGE: 40 y.o. SURGEON: Leila Paget, MD  ASSISTANT: Kenia Moreland (PGY1)     BREAST SURGEON: Annalee Bustillo MD (R) & Marian Alvarado (L)    PREOPERATIVE DIAGNOSIS: Acquired absence bilateral breast, status post mastectomy   POSTOPERATIVE DIAGNOSIS: Same     OPERATION: 1) Immediate bilateralstage I breast reconstruction with tissue expander placement    2) Insertion of Alloderm Acellular Dermal Matrix (328 cm^2))     ANESTHESIA: General     ESTIMATED BLOOD LOSS: 50 mL (from plastics)    OPERATIVE INDICATIONS: This is a 40 y.o. female who underwent mastectomy for breast cancer with details listed in a separate operative dictation. Options of reconstruction were discussed with the patient and she opted for a staged approach using tissue expander placement in the first stage. The plan of surgery, risks, benefits, alternatives, indications, limitations, possible complications, and future surgeries were discussed with the patient and she agreed to proceed. OPERATIVE PROCEDURE: The patient was marked in the standing position in the preoperative holding area. She was then brought to the operating room and placed in the supine position on the operating table. After satisfactory induction with general endotracheal anesthesia, the patient was then prepped and draped in the usual sterile fashion. Breast surgery commenced by performing their mastectomy. Details of the procedure are listed in a separate operative dictation. Plastic surgery then scrubbed and obtained hemostasis on the right breast initially with electrocautery. Prepectoral reconstruction was performed by measuring the breast size after hemostasis was obtained and the wound copiously irrigated.  An Exparel breast block was performed and then an expander was then placed in antibiotic solution after gloves were changed. Two sheets of Alloderm ADM were then utilized to wrap the expander using 2-0 PDS suture after being placed in antibiotic solution itself. The tabs were then marked with a marking pen and the expander was placed into the prepectoral space. The expander was secured in place using 2-0 PDS sutures at the 3, 6, & 9 o'clock tab positions. A 15F drain was then brought out from a separate stab incision and secured in place using 3-0 Nylon suture. Approximately 475 mL of air was placed into the expander without evidence of tension on the mastectomy skin flaps. The wound was then closed in layers using 3-0 Monocryl suture. Attention was then directed toward the contralateral left side and the same procedure was performed. Prepectoral reconstruction was performed by measuring the breast size after hemostasis was obtained and the wound copiously irrigated. An Exparel breast block was performed and then an expander was then placed in antibiotic solution after gloves were changed. Two sheets of Alloderm ADM were then utilized to wrap the expander using 2-0 PDS suture after being placed in antibiotic solution itself. The tabs were then marked with a marking pen and the expander was placed into the prepectoral space. The expander was secured in place using 2-0 PDS sutures at the 3, 6, & 9 o'clock tab positions. A 15F drain was then brought out from a separate stab incision and secured in place using 3-0 Nylon suture. Approximately 475 mL of air was placed into the expander without evidence of tension on the mastectomy skin flaps. There was a small cautery injury that was seen from the mastectomy and this area was excised with a 15 blade and then the wound was then closed in layers using 3-0 Monocryl suture. Sterile dressings were then applied. The patient was then awakened, extubated, and taken to the PACU in stable condition.   At the end of the case, all counts were correct and there were no immediate complications. The patient tolerated the procedure well. DRAINS: 2 (15F in each breast)    SPECIMEN: Left mastectomy skin    CONDITION: Stable    MASTECTOMY SPECIMEN WEIGHT: R 781.5 grams; L 787.2 grams    IMPLANTS:   (Right) 475 mL [place] High Profile, reference# Leata Ort, lot# [id number]. (Right) Alloderm RTU, contour, perforated; size (328 cm^2); ref# [id number], lot# [id number] & [id number]. (Left)   475 mL [place] High Profile, reference# Leata Ort, lot# [id number]. (Left)  Alloderm RTU, contour, perforated; size (328 cm^2)); ref# [id number], lot# [id number] & [id number].     Temitope Scott MD

## 2022-10-01 NOTE — OP NOTE
Operative Note      Patient: Mar Mackenzie  YOB: 1975  MRN: [id number]    Date of Procedure: 9/30/2022    Pre-Op Diagnosis: Malignant neoplasm of lower-outer quadrant of left breast of female, estrogen receptor positive ([address] [C50.512, Z17.0] Infiltrating duct and lobular carcinoma, unspecified laterality ([address] [C50.919], PALB2 mutation    Post-Op Diagnosis: Same       Procedure(s):  LEFT BREAST MODIFIED RADICAL MASTECTOMY, LEFT BREAST SENTINEL NODE BIOPSY, AXILLARY LYMPH NODE DISSECTION WITH [id number];  RIGHT BREAST SIMPLE MASTECTOMY;  BILATERAL BREAST RECONSTRUCTION WITH STAGE 1 TISSUE EXPANDER PLACEMENT WITH ALLODERM AND CREATION OF AUTODERM FLAPS    Surgeon(s):  Dana Severino MD Leeann Foil, MD Loli Veras, MD Loli Veras MD    Assistant:   Surgical Assistant: Afshin Pedro  Resident: Monik Danielle MD    Anesthesia: General    Estimated Blood Loss (mL): 011     Complications: None    Specimens:   ID Type Source Tests Collected by Time Destination   A : A. RIGHT  Breast Tissue Breast [address] [place] [address],  9/30/2022 2029    B : B. LEFT Breast Tissue Breast SURGICAL PATHOLOGY [facility],  9/30/2022 1045    C : C. Level 1 lymph node Left Axilla Tissue Tissue SURGICAL PATHOLOGY [facility],  9/30/2022 1054    D : LEVEL 1 LEFT AXILLARY LYMPH NODE Tissue Tissue SURGICAL PATHOLOGY [facility],  9/30/2022 1100    E : E. [place] lymph node #1 hot blue [id number] Tissue Tissue SURGICAL PATHOLOGY [facility],  9/30/2022 1102    F : LEFT AXILLARY LYMPH NODE #3 LEVEL 1 Tissue Tissue SURGICAL PATHOLOGY [facility],  9/30/2022 1151        Implants:  Implant Name Type Inv.  Item Serial No.  Lot No. LRB No. Used Action   GRAFT HUM TISS THK2-2.8MM THCK L PERF CNTOUR ACELLULAR DERM - [id number] Other GRAFT HUM TISS [facility] [place] [place] 35 ACELLULAR DERM  "[employer] INC-WD [id number] Right 1 Implanted   GRAFT HUM TISS THK2-2.8MM [address] L PERF CNTOUR ACELLULAR DERM - [id number] Other GRAFT HUM TISS THK2-2.8MM [facility]  [employer]-WD [id number] Right 1 Implanted   GRAFT HUM TISS THK2-2.8MM [address] L PERF CNTOUR ACELLULAR DERM - [id number] Other GRAFT HUM TISS THK2-2.8MM [address] [employer] [id number] Left 1 Implanted   GRAFT HUM TISS THK2-2.8MM THCK L PERF CNTOUR ACELLULAR DERM - [id number] Other GRAFT HUM TISS THK2-2.8MM [address] [employer] [id number] Left 1 Implanted         Drains:   Closed/Suction Drain Lateral RUQ (Active)   Site Description Clean, dry & intact 09/30/22 1523   Dressing Status Clean, dry & intact 09/30/22 1523   Output (ml) 10 ml 09/30/22 1523       Closed/Suction Drain Lateral LUQ (Active)   Site Description Clean, dry & intact 09/30/22 1523   Dressing Status Clean, dry & intact 09/30/22 1523   Output (ml) 25 ml 09/30/22 1523       [REMOVED] Urinary Catheter 09/30/22 Birch (Removed)   Output (mL) 75 mL 09/30/22 1235       Findings: normal appearing breast tissue, right breast    Indications: The patient is a 40 year woman who has completed neoadjuvant chemotherapy for a left breast cancer and genetic testing which was positive for PALB2. The patient desires right prophylactic mastectomy for risk reduction. Detained Description of Procedure: The patient was taken to the operating room and placed in the supine position. After induction of general anesthesia, both breasts and axilla were prepped and draped in the usual sterile fashion. We created an vertical elliptical incision encompassing the nipple areolar complex. We then proceeded with development of the mastectomy flaps in a radial fashion extending the dissection to the level of the sternum clavicle latissimus dorsi and inframammary crease. We then dissected the breast parenchyma and pectoralis fascia in a medial to lateral direction. We encountered multiple skin bleeders and controlled these with electrocautery or clips. The surgical site was irrigated and inspected for hemostasis the surgical site was packed with a moist lap sponge pending placement of a tissue expander by Dr. Chata Laughlin. The contralateral mastectomy and sentinel node biopsy was performed by Dr. Aroldo De Leon and dictated under separate cover. At the conclusion of the right mastectomy the patient was in stable condition with blood loss estimated at 200 mL.       Electronically signed by Jamar Cruz MD on 9/30/2022 at 8:00 PM

## 2022-10-03 ENCOUNTER — TELEPHONE (OUTPATIENT)
Dept: SURGERY | Age: 47
End: 2022-10-03

## 2022-10-03 DIAGNOSIS — C50.912 MALIGNANT NEOPLASM OF LEFT BREAST IN FEMALE, ESTROGEN RECEPTOR POSITIVE, UNSPECIFIED SITE OF BREAST (HCC): Primary | ICD-10-CM

## 2022-10-03 DIAGNOSIS — Z17.0 MALIGNANT NEOPLASM OF LEFT BREAST IN FEMALE, ESTROGEN RECEPTOR POSITIVE, UNSPECIFIED SITE OF BREAST (HCC): Primary | ICD-10-CM

## 2022-10-03 RX ORDER — DIAZEPAM 5 MG/1
5 TABLET ORAL EVERY 6 HOURS PRN
Qty: 15 TABLET | Refills: 0 | OUTPATIENT
Start: 2022-10-03 | End: 2022-10-13

## 2022-10-03 NOTE — TELEPHONE ENCOUNTER
Spoke with spouse was told to hold pain med and we will send valium to pharm see if thte itching goes away.

## 2022-10-03 NOTE — TELEPHONE ENCOUNTER
Per NP, reva Jean Baptiste to send valium to pharmacy. Script called into pharmacy  15 tabs zero refills.

## 2022-10-03 NOTE — OP NOTE
Operative Note      Patient: Amy Craft  YOB: 1975  MRN: [id number]    Date of Procedure: 9/30/2022    Pre-Op Diagnosis: Malignant neoplasm of lower-outer quadrant of left breast of female, estrogen receptor positive ([facility] [C50.512, Z17.0] Infiltrating duct and lobular carcinoma, unspecified laterality ([facility] [C50.919]    Post-Op Diagnosis: Same       Procedure(s):  LEFT BREAST MODIFIED RADICAL MASTECTOMY, LEFT BREAST SENTINEL NODE BIOPSY, AXILLARY LYMPH NODE DISSECTION WITH [address] [facility];  RIGHT BREAST SIMPLE MASTECTOMY;  BILATERAL BREAST RECONSTRUCTION WITH STAGE 1 TISSUE EXPANDER PLACEMENT WITH ALLODERM AND CREATION OF AUTODERM FLAPS    Surgeon(s):  Susan Blackwell MD Lysle Pray, MD Lysle Pray, MD Lysle Pray, MD    Assistant:   Surgical Assistant: Mukund Isbell; Mary Guerra  Resident: Skyler Isidro MD    Anesthesia: General    Estimated Blood Loss (mL): 558     Complications: None    Specimens:   ID Type Source Tests Collected by Time Destination   A : A. RIGHT  Breast Tissue Breast [address], DO 9/30/2022 3150    B : B. LEFT Breast Tissue Breast SURGICAL PATHOLOGY [facility], DO 9/30/2022 1045    C : C. Level 1 lymph node Left Axilla Tissue Tissue SURGICAL PATHOLOGY [facility], DO 9/30/2022 1054    D : LEVEL 1 LEFT AXILLARY LYMPH NODE Tissue Tissue SURGICAL PATHOLOGY [facility], DO 9/30/2022 1100    E : E. [place] lymph node #1 hot blue [id number] Tissue Tissue SURGICAL PATHOLOGY [facility], DO 9/30/2022 1102    F : LEFT AXILLARY LYMPH NODE #3 LEVEL 1 Tissue Tissue SURGICAL PATHOLOGY [facility], DO 9/30/2022 1151        Implants:  Implant Name Type Inv.  Item Serial No.  Lot No. LRB No. Used Action   GRAFT HUM TISS THK2-2.8MM THCK L PERF CNTOUR ACELLULAR DERM - [id number] Other GRAFT HUM TISS THK2-2.8MM THCK L PERF [address] [facility] [id number] Right 1 Implanted   GRAFT HUM TISS THK2-2.8MM 350 [facility] - [id number] Other GRAFT HUM TISS THK2-2.8MM 350 [facility]  Santiago Esters [facility] [id number] Right 1 Implanted   GRAFT HUM TISS THK2-2.8MM THCK L PERF CNTOUR ACELLULAR DERM - [id number] Other GRAFT HUM TISS THK2-2.8MM [address] [facility] [id number] Left 1 Implanted   GRAFT HUM TISS THK2-2.8MM [address] L PERF CNTOUR ACELLULAR DERM - [id number] Other GRAFT HUM TISS THK2-2.8MM [address] [facility] [id number] Left 1 Implanted         Drains:   [REMOVED] Closed/Suction Drain Lateral RUQ (Removed)   Site Description Clean, dry & intact 09/30/22 1523   Dressing Status Clean, dry & intact 09/30/22 1523   Output (ml) 10 ml 09/30/22 1523       [REMOVED] Closed/Suction Drain Lateral LUQ (Removed)   Site Description Clean, dry & intact 09/30/22 1523   Dressing Status Clean, dry & intact 09/30/22 1523   Output (ml) 25 ml 09/30/22 1523       [REMOVED] Urinary Catheter 09/30/22 Birch (Removed)   Output (mL) 75 mL 09/30/22 1235       Findings: [place] lymph node x1 with clip and RF ID tag present confirmed by Trident radiograph    Detailed Description of Procedure: Patient is a 71-year-old female found to be positive for invasive lobular versus invasive ductal carcinoma with lobular features on the left side she was ER/AZ positive HER2 negative with a positive metastasis to the left lymph node. The tumor measured 1.5 x 2.0 at the 6 o'clock position. She was referred to tumor board and her Oncotype DX score was 37. 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 She underwent neoadjuvant chemotherapy and presents now for surgical intervention. The risks benefits and procedure were all explained to the patient and consent was obtained.   She was marked preoperatively with Dr. Latisha Franco myself and Dr. Manohar Stapleton present. She was given preoperative antibiotics and SCDs were placed. General anesthesia was introduced. A surgical pause was initiated and completed. The left breast was prepped and she was injected with Lymphoseek per the nuclear medicine tech. I injected Lymphazurin to the left breast.  It was massaged for 3 to 5 minutes. The area was then prepped and draped in normal sterile fashion a surgical pause was again initiated and completed. Dr. Franck Mathur performed the right simple mastectomy she will dictate this portion of the surgery. I made a vertical elliptical incision around the nipple areolar complex. I created flaps to the medial aspect of the sternum inferiorly to the rectus sheath superiorly to the clavicle and laterally at the breast mound. The breast was sent off as a specimen marked with a margin map. I directed my attention to the axilla I was able to find the level one tagged sentinel lymph node. It measured 28708 x 100. It was hot and blue this was the lymph node that had been previously marked with a clip and had an RF ID tag and it this lymph node was placed in the Trident for radiograph and the RF ID tag was present. This was sent off for pathology. Frozen section determined that it did not have any residual cancer. I found for other lymph nodes that were not hot or blue at level 1. These were sent off as permanent specimens. Hemostasis was maintained using electrocautery and clips during the surgery. We measured approximately 200 cc for the entire procedure including the right simple mastectomy. Dr. Manohar Stapleton came in to perform his portion of the reconstruction. To this point the patient tolerated the procedure well Dr. Manohar Stapleton will dictate his portion and another dictation.       Electronically signed by Sayda Mitchell DO on 10/3/2022 at 10:29 AM

## 2022-10-03 NOTE — TELEPHONE ENCOUNTER
Colby Wallace called stating that the patient gets itchy when taking Oxycodone. She would like to know if there is something else that could be prescribed.     Please call: [phone]

## 2022-10-10 ENCOUNTER — OFFICE VISIT (OUTPATIENT)
Dept: SURGERY | Age: 47
End: 2022-10-10

## 2022-10-10 VITALS
BODY MASS INDEX: 33.3 KG/M2 | TEMPERATURE: 97.2 F | DIASTOLIC BLOOD PRESSURE: 81 MMHG | OXYGEN SATURATION: 98 % | WEIGHT: 194 LBS | HEART RATE: 84 BPM | SYSTOLIC BLOOD PRESSURE: 112 MMHG

## 2022-10-10 DIAGNOSIS — Z09 POSTOP CHECK: Primary | ICD-10-CM

## 2022-10-10 PROCEDURE — 99024 POSTOP FOLLOW-UP VISIT: CPT

## 2022-10-10 NOTE — PROGRESS NOTES
[facility]    PROCEDURE: 1) Immediate bilateralstage I breast reconstruction with tissue expander placement                          2) Insertion of Alloderm Acellular Dermal Matrix (328 cm^2))      DATE: 9/30/22    Riya Weaver has been recovering well since her procedure. Pain has been well controlled with intermittent pain medications She presents today for potential drain removal.     EXAM    /81   Pulse 84   Temp 97.2 °F (36.2 °C)   Wt 194 lb (88 kg)   SpO2 98%   BMI 33.30 kg/m²       GEN: NAD   BREAST: Incision healing well. No hematoma/seroma. Drains serosang. IMP: 40 y. o.female s/p B TE  PLAN: Doing well. Right drain removed under 30 ml for two consecutive days. The left drain is not under 30 ml, will need to leave in until meets criteria.      Pearl Howard, APRN - [address] [facility] Reconstructive Surgery  [phone]  10/10/22

## 2022-10-12 NOTE — PROGRESS NOTES
[facility]    PROCEDURE: 1) Immediate bilateralstage I breast reconstruction with tissue expander placement                          2) Insertion of Alloderm Acellular Dermal Matrix (328 cm^2))      DATE: 9/30/22    Susie Nina has been recovering well since her procedure. Pain has been well controlled without pain medications. EXAM    /79   Pulse 94   Temp 97.7 °F (36.5 °C)   Ht 5' 4\" (1.626 m)   Wt 198 lb (89.8 kg)   SpO2 98%   BMI 33.99 kg/m²     GEN: NAD   BREAST: Incision healing well  No hematoma/seroma  Drain serosang    IMP: 40 y. o.female s/p B TE  PLAN: Drain removed. Will start filling next week.     Oneil Martinez MD  [address] Reconstructive Surgery  [phone]  10/17/22

## 2022-10-13 ENCOUNTER — TELEPHONE (OUTPATIENT)
Dept: SURGERY | Age: 47
End: 2022-10-13

## 2022-10-13 NOTE — TELEPHONE ENCOUNTER
Tae Loyd called this AM reporting that the drainage has been more than 30 cc for most of the week. Drain removal appointment rescheduled to  10/17/2022.

## 2022-10-17 ENCOUNTER — OFFICE VISIT (OUTPATIENT)
Dept: SURGERY | Age: 47
End: 2022-10-17

## 2022-10-17 VITALS
OXYGEN SATURATION: 98 % | DIASTOLIC BLOOD PRESSURE: 79 MMHG | HEART RATE: 94 BPM | SYSTOLIC BLOOD PRESSURE: 114 MMHG | HEIGHT: 64 IN | WEIGHT: 198 LBS | TEMPERATURE: 97.7 F | BODY MASS INDEX: 33.8 KG/M2

## 2022-10-17 DIAGNOSIS — Z09 POSTOP CHECK: Primary | ICD-10-CM

## 2022-10-17 PROCEDURE — 99024 POSTOP FOLLOW-UP VISIT: CPT | Performed by: SURGERY

## 2022-10-27 ENCOUNTER — OFFICE VISIT (OUTPATIENT)
Dept: SURGERY | Age: 47
End: 2022-10-27

## 2022-10-27 VITALS
OXYGEN SATURATION: 98 % | HEART RATE: 95 BPM | TEMPERATURE: 98.7 F | DIASTOLIC BLOOD PRESSURE: 79 MMHG | WEIGHT: 198 LBS | SYSTOLIC BLOOD PRESSURE: 115 MMHG | BODY MASS INDEX: 33.99 KG/M2

## 2022-10-27 DIAGNOSIS — Z09 POSTOP CHECK: Primary | ICD-10-CM

## 2022-10-27 PROCEDURE — 99024 POSTOP FOLLOW-UP VISIT: CPT

## 2022-11-01 ENCOUNTER — TELEPHONE (OUTPATIENT)
Dept: SURGERY | Age: 47
End: 2022-11-01

## 2022-11-01 NOTE — TELEPHONE ENCOUNTER
Patient called to get an update on disability paperwork dropped off on 10/31/22.     Please call: [phone]

## 2022-11-10 ENCOUNTER — OFFICE VISIT (OUTPATIENT)
Dept: SURGERY | Age: 47
End: 2022-11-10

## 2022-11-10 VITALS
OXYGEN SATURATION: 98 % | WEIGHT: 198 LBS | SYSTOLIC BLOOD PRESSURE: 128 MMHG | TEMPERATURE: 97.9 F | DIASTOLIC BLOOD PRESSURE: 98 MMHG | HEART RATE: 95 BPM | BODY MASS INDEX: 33.99 KG/M2

## 2022-11-10 DIAGNOSIS — Z09 POSTOP CHECK: Primary | ICD-10-CM

## 2022-11-10 PROCEDURE — 99024 POSTOP FOLLOW-UP VISIT: CPT

## 2022-11-11 NOTE — PROGRESS NOTES
[facility]    PROCEDURE:  1) Immediate bilateralstage I breast reconstruction with tissue expander placement                          2) Insertion of Alloderm Acellular Dermal Matrix (328 cm^2))   DATE: 9/30/22    Riya Weaver has been recovering well since her procedure. Pain has been well controlled without pain medications. She presents today for her final TE fill. She meets with radiology next week to discuss radiation therapy. EXAM  BP (!) 128/98   Pulse 95   Temp 97.9 °F (36.6 °C)   Wt 198 lb (89.8 kg)   SpO2 98%   BMI 33.99 kg/m²      GEN: NAD   BREAST: Incision healing appropriately. No hematoma/seroma. IMP: 56 y. o.female s/p B TE  PLAN: Doing well overall. A total of 200 ml of sterile saline was placed into the bilateral expanders, bringing the total to 400 ml (475 ml expander). She will follow up with us once she is finished with radiation therapy.      Pearl Howard, APRN - [address] [facility] Reconstructive Surgery  [phone]  11/10/22
Adequate: hears normal conversation without difficulty

## 2022-11-25 ENCOUNTER — HOSPITAL ENCOUNTER (OUTPATIENT)
Dept: CT IMAGING | Age: 47
Discharge: HOME | End: 2022-11-25
Payer: COMMERCIAL

## 2022-11-25 DIAGNOSIS — C50.112 MALIGNANT NEOPLASM OF CENTRAL PORTION OF LEFT FEMALE BREAST, UNSPECIFIED ESTROGEN RECEPTOR STATUS (HCC): ICD-10-CM

## 2022-11-25 PROCEDURE — 71250 CT THORAX DX C-: CPT

## 2023-02-13 ENCOUNTER — TELEPHONE (OUTPATIENT)
Dept: SURGERY | Age: 48
End: 2023-02-13

## 2023-02-13 NOTE — TELEPHONE ENCOUNTER
Patient called in wanting to go over her plan of care and wanting to know what her next steps are    Patient would also like to know if she needs to come in for a fill     Please contact the patient at [phone]

## 2023-02-13 NOTE — TELEPHONE ENCOUNTER
Spoke with Brian Hernandez. Appointment scheduled to discuss second stage and possible TE fill after completing radiation.

## 2023-02-28 ENCOUNTER — HOSPITAL ENCOUNTER (OUTPATIENT)
Dept: GENERAL RADIOLOGY | Age: 48
Discharge: HOME | End: 2023-02-28
Payer: COMMERCIAL

## 2023-02-28 DIAGNOSIS — C50.112 MALIGNANT NEOPLASM OF CENTRAL PORTION OF LEFT FEMALE BREAST, UNSPECIFIED ESTROGEN RECEPTOR STATUS (HCC): ICD-10-CM

## 2023-02-28 DIAGNOSIS — N95.1 POSTMENOPAUSAL DISORDER: ICD-10-CM

## 2023-02-28 PROCEDURE — 77080 DXA BONE DENSITY AXIAL: CPT

## 2023-03-01 ENCOUNTER — OFFICE VISIT (OUTPATIENT)
Dept: SURGERY | Age: 48
End: 2023-03-01
Payer: COMMERCIAL

## 2023-03-01 VITALS
HEART RATE: 81 BPM | HEIGHT: 64 IN | TEMPERATURE: 98.1 F | WEIGHT: 196 LBS | DIASTOLIC BLOOD PRESSURE: 78 MMHG | BODY MASS INDEX: 33.46 KG/M2 | SYSTOLIC BLOOD PRESSURE: 112 MMHG

## 2023-03-01 DIAGNOSIS — Z09 POSTOP CHECK: Primary | ICD-10-CM

## 2023-03-01 DIAGNOSIS — C50.912 MALIGNANT NEOPLASM OF LEFT BREAST IN FEMALE, ESTROGEN RECEPTOR POSITIVE, UNSPECIFIED SITE OF BREAST (HCC): ICD-10-CM

## 2023-03-01 DIAGNOSIS — Z17.0 MALIGNANT NEOPLASM OF LEFT BREAST IN FEMALE, ESTROGEN RECEPTOR POSITIVE, UNSPECIFIED SITE OF BREAST (HCC): ICD-10-CM

## 2023-03-01 PROCEDURE — 99213 OFFICE O/P EST LOW 20 MIN: CPT | Performed by: SURGERY

## 2023-03-01 NOTE — PROGRESS NOTES
[facility]    PROCEDURE: 1) Immediate bilateralstage I breast reconstruction with tissue expander placement                          2) Insertion of Alloderm Acellular Dermal Matrix (328 cm^2))      DATE: 9/30/22    Haley Metzger has been recovering well since her procedure. Pain has been well controlled without pain medications. She completed her radiation treatment (1/3/22).      PMHx:   Past Medical History:   Diagnosis Date    Anxiety     Cancer of overlapping sites of left breast ([address] 03/15/2022    Sleep difficulties     Wears glasses      PSHx:   Past Surgical History:   Procedure Laterality Date    BREAST ENHANCEMENT SURGERY Bilateral 9/30/2022    BILATERAL BREAST RECONSTRUCTION WITH STAGE 1 TISSUE EXPANDER PLACEMENT WITH ALLODERM AND CREATION OF AUTODERM FLAPS performed by Rock Erika MD at [facility] Right 9/30/2022    RIGHT BREAST SIMPLE MASTECTOMY; performed by Kevin Yu DO at [facility], [address] Left 9/30/2022    LEFT BREAST MODIFIED RADICAL MASTECTOMY, LEFT BREAST SENTINEL NODE BIOPSY, AXILLARY LYMPH NODE DISSECTION WITH [address]; performed by Kevin Yu DO at [address] [place] [address] N/A 4/5/2022    Attempted RIGHT subclavian, Right IJ PORT A CATHETER INSERTION, ultrasound guidance performed by Kevin Yu DO at [address] [place] [address] Right 4/5/2022    REPOSITION OF RIGHT PORT INSERTION performed by Kevin Yu DO at [facility] LEFT Left 3/11/2022    US BREAST NEEDLE BIOPSY LEFT 3/11/2022 Sabrina Phillips MD [facility] ULTRASOUND    US LYMPH NODE BIOPSY  3/11/2022    US LYMPH NODE BIOPSY 3/11/2022 Sabrina Phillips MD [facility] ULTRASOUND    WISDOM TOOTH EXTRACTION  1999     Allergy:   Allergies   Allergen Reactions    Erythromycin Other (See Comments)     Upset stomach    Fluticasone Propionate Dizziness or Vertigo Penicillins Hives    Zyrtec [Cetirizine] Palpitations       SHx:   Social History     Socioeconomic History    Marital status:      Spouse name: Not on file    Number of children: Not on file    Years of education: Not on file    Highest education level: Not on file   Occupational History    Not on file   Tobacco Use    Smoking status: Former     Types: Cigarettes     Quit date: 2016     Years since quittin.5    Smokeless tobacco: Never   Vaping Use    Vaping Use: Never used   Substance and Sexual Activity    Alcohol use: Yes     Alcohol/week: 3.0 standard drinks     Types: 3 Glasses of wine per week     Comment: 3 times week    Drug use: No    Sexual activity: Not on file   Other Topics Concern    Not on file   Social History Narrative    Not on file     Social Determinants of Health     Financial Resource Strain: Not on file   Food Insecurity: Not on file   Transportation Needs: Not on file   Physical Activity: Not on file   Stress: Not on file   Social Connections: Not on file   Intimate Partner Violence: Not on file   Housing Stability: Not on file     FHx: Multiple members with breast CA    Meds:   Current Outpatient Medications   Medication Sig Dispense Refill    Misc Natural Products (RELIZEN) TABS Take 2 tablets by mouth daily      Probiotic Product (RA PROBIOTIC GUMMIES PO) Take by mouth      vitamin C (ASCORBIC ACID) 500 MG tablet Take 500 mg by mouth 2 times daily      vitamin B-12 (CYANOCOBALAMIN) 100 MCG tablet Take 50 mcg by mouth daily      traZODone (DESYREL) 50 MG tablet Take 50 mg by mouth nightly      Cholecalciferol (VITAMIN D) 50 MCG ( UT) CAPS capsule Take by mouth      esomeprazole (NEXIUM) 40 MG delayed release capsule Take 40 mg by mouth every morning (before breakfast)      Melatonin 10 MG TABS Take 1 tablet by mouth       No current facility-administered medications for this visit. ROS   Constitutional: Negative for chills and fever.    HENT: Negative for congestion, facial swelling, and voice change. Eyes: Negative for photophobia and visual disturbance. Respiratory: Negative for apnea, cough, chest tightness and shortness of breath. Cardiovascular: Negative for chest pain and palpitations. Gastrointestinal: Negative for dysphagia and early satiety. Genitourinary: Negative for difficulty urinating, dysuria, flank pain, frequency and hematuria. Musculoskeletal: Negative for new gait problem, joint swelling and myalgias. Skin: Negative for color change, pallor and rash. Endocrine: negative for tremors, temperature intolerance or polydipsia. Allergic/Immunologic: Negative for new environmental or food allergies. Neurological: Negative for dizziness, seizures, speech difficulty, numbness. Hematological: Negative for adenopathy. Psychiatric/Behavioral: Negative for agitation and confusion. EXAM    /78 (Site: Left Upper Arm, Position: Sitting)   Pulse 81   Temp 98.1 °F (36.7 °C) (Temporal)   Ht 5' 4\" (1.626 m)   Wt 196 lb (88.9 kg)   BMI 33.64 kg/m²     GEN: NAD   BREAST: Incisions well healed  Radiation changes noted on the left breast  ABD: No palpable hernia    IMP: 56 y. o.female s/p B TE  PLAN: Discussed options with Evan Restrepo. Will return in September for evaluation and then anticipate IBR with fat grafting in December. She understands the limitations of implant based reconstructionin the setting of radiation, however is leaning toward this.     Suzette Benito MD  [address] [address] Reconstructive Surgery  [phone]  03/01/23

## 2023-04-17 ENCOUNTER — HOSPITAL ENCOUNTER (OUTPATIENT)
Dept: PHYSICAL THERAPY | Age: 48
Setting detail: THERAPIES SERIES
Discharge: HOME | End: 2023-04-17
Payer: COMMERCIAL

## 2023-04-17 PROCEDURE — 97161 PT EVAL LOW COMPLEX 20 MIN: CPT

## 2023-04-17 PROCEDURE — 97110 THERAPEUTIC EXERCISES: CPT

## 2023-04-17 PROCEDURE — 97530 THERAPEUTIC ACTIVITIES: CPT

## 2023-04-17 NOTE — FLOWSHEET NOTE
encouraged stretches to be performed daily)    Therapeutic Exercise:   [x] (79014) Provided verbal/tactile cueing for activities related to strengthening, flexibility, endurance, ROM  for improvements in scapular, scapulothoracic and UE control with self care, reaching, carrying, lifting, house/yardwork, driving/computer work. NMR:   [] (68924) Provided verbal/tactile cueing for activities related to improving balance, coordination, kinesthetic sense, posture, motor skill, proprioception  to assist with  scapular, scapulothoracic and UE control with self care, reaching, carrying, lifting, house/yardwork, driving/computer work. Therapeutic Activities:    [x] (88044) Provided verbal/tactile cueing for activities related to improving balance, coordination, kinesthetic sense, posture, motor skill, proprioception and motor activation to allow for proper function of scapular, scapulothoracic and UE control with self care, carrying, lifting, driving/computer work. Pt educated on risk factors for lymphedema and s/s of lymphedema. Reviewed and dispensed handout on lymphedema risk reduction practices which includes s/s of infection and compression sleeve/hand piece for air travel. Encouraged more time not sleeping on L side as able. Instructed in activation of lymphatic system (not neck due to port) abdominal with deep breathing and activating R axilla and L inguinal nodes. Pt verbalized good understanding of all.     Home Exercise Program:    [x] (55690) Reviewed/Progressed HEP activities related to strengthening, flexibility, endurance, ROM of scapular, scapulothoracic and UE control with self care, reaching, carrying, lifting, house/yardwork, driving/computer work.   [] (21026) Reviewed/Progressed HEP activities related to improving balance, coordination, kinesthetic sense, posture, motor skill, proprioception of scapular, scapulothoracic and UE control with self care, reaching, carrying, lifting,

## 2023-04-17 NOTE — THERAPY EVALUATION
[facility] Outpatient [facility]  [address] [place] [address], [address], [address]  Phone: [phone]   Fax: [phone]                                                       Physical Therapy Certification/Initial Treatment/Discharge    Dear Shandra Rubio MD  ,    We had the pleasure of evaluating the following patient for physical therapy services at [facility] ([facility]). A summary of our findings can be found in the initial assessment below. This includes our plan of care. If you have any questions or concerns regarding these findings, please do not hesitate to contact me at the office phone number checked above. Thank you for the referral.       Physician Signature:_______________________________Date:__________________  By signing above (or electronic signature), therapist's plan is approved by physician      Patient: Be Viera   : 1975   MRN: [id number]  Referring Physician:  Shandra Rubio MD     Evaluation Date: 2023      Medical Diagnosis Information:  Diagnosis: Personal history of malignant neoplasm of breast Z85.3, Lymphedema I89.0   Treatment Diagnosis: stiffness L shoulder M25.612                                         Insurance information: PT Insurance Information: CHEMA Mcnair required     Precautions/ Contra-indications: Hx breast CA, anxiety, osteopenia  Latex Allergy:  [x]NO      []YES  Preferred Language for Healthcare:   [x]English       []other:  C-SSRS Triggered by Intake questionnaire (Past 2 wk assessment):   [x] No, Questionnaire did not trigger screening.   [] Yes, Patient intake triggered further evaluation      [] C-SSRS Screening completed  [] PCP notified via Plan of Care  [] Emergency services notified    SUBJECTIVE   History of Present Illness:      Pt presents with c/o tightness/decreased strength L shoulder and here for lymphedema risk reduction education.      Onset date: after sx/radiation  Course of tx: Pt had B mastectomy (R simple

## 2023-09-05 NOTE — PROGRESS NOTES
[facility]    PROCEDURE: 1) Immediate bilateral stage I breast reconstruction with tissue expander placement                          2) Insertion of Alloderm Acellular Dermal Matrix (328 cm^2))      DATE: 9/30/22    Aurea Flores has been recovering well since her procedure. Pain has been well controlled without pain medications. She completed her radiation treatment (1/3/22). Since her last evaluation, she notes no changes in her history and presents back for discussion regarding her second stage.     PMHx:   Past Medical History:   Diagnosis Date    Anxiety     Cancer of overlapping sites of left breast ([address]) 03/15/2022    Sleep difficulties     Wears glasses      PSHx:   Past Surgical History:   Procedure Laterality Date    BREAST ENHANCEMENT SURGERY Bilateral 9/30/2022    BILATERAL BREAST RECONSTRUCTION WITH STAGE 1 TISSUE EXPANDER PLACEMENT WITH ALLODERM AND CREATION OF AUTODERM FLAPS performed by Kevin Miramontes MD at [facility] Right 9/30/2022    RIGHT BREAST SIMPLE MASTECTOMY; performed by Corbin Orellana DO at [address] [place] [address] Left 9/30/2022    LEFT BREAST MODIFIED RADICAL MASTECTOMY, LEFT BREAST SENTINEL NODE BIOPSY, AXILLARY LYMPH NODE DISSECTION WITH [address]; performed by Corbin Orellana DO at [address] N/A 4/5/2022    Attempted RIGHT subclavian, Right IJ PORT A CATHETER INSERTION, ultrasound guidance performed by Corbin Orellana DO at [address] Right 4/5/2022    REPOSITION OF RIGHT PORT INSERTION performed by Corbin Orellana DO at [facility] Left 3/11/2022    US BREAST NEEDLE BIOPSY LEFT [facility]11[facility]20[facility]    US LYMPH NODE BIOPSY  3/11/2022    US LYMPH NODE BIOPSY 3/11/2022 [facility] ULTRASOUND    WISDOM TOOTH EXTRACTION  1999     Allergy:   Allergies

## 2023-09-06 ENCOUNTER — OFFICE VISIT (OUTPATIENT)
Dept: SURGERY | Age: 48
End: 2023-09-06
Payer: COMMERCIAL

## 2023-09-06 VITALS
HEART RATE: 87 BPM | WEIGHT: 193 LBS | OXYGEN SATURATION: 100 % | BODY MASS INDEX: 33.13 KG/M2 | SYSTOLIC BLOOD PRESSURE: 118 MMHG | DIASTOLIC BLOOD PRESSURE: 86 MMHG | TEMPERATURE: 97.3 F | RESPIRATION RATE: 18 BRPM

## 2023-09-06 DIAGNOSIS — Z09 POSTOP CHECK: Primary | ICD-10-CM

## 2023-09-06 PROCEDURE — 99213 OFFICE O/P EST LOW 20 MIN: CPT | Performed by: SURGERY

## 2023-09-06 RX ORDER — MELOXICAM 7.5 MG/1
TABLET ORAL
COMMUNITY
Start: 2023-08-19

## 2023-09-06 RX ORDER — LETROZOLE 2.5 MG/1
TABLET, FILM COATED ORAL
COMMUNITY
Start: 2023-08-09

## 2023-09-06 RX ORDER — GABAPENTIN 300 MG/1
CAPSULE ORAL
COMMUNITY
Start: 2023-08-02

## 2023-09-06 RX ORDER — OXYBUTYNIN CHLORIDE 5 MG/1
5 TABLET ORAL 2 TIMES DAILY
COMMUNITY
Start: 2023-08-24

## 2023-09-06 RX ORDER — DULOXETIN HYDROCHLORIDE 60 MG/1
60 CAPSULE, DELAYED RELEASE ORAL DAILY
COMMUNITY
Start: 2023-08-07

## 2023-09-07 ENCOUNTER — TELEPHONE (OUTPATIENT)
Dept: SURGERY | Age: 48
End: 2023-09-07

## 2023-09-07 NOTE — TELEPHONE ENCOUNTER
The patient was in the office to see Dr. Emily Mahoney yesterday. PLAN: Discussed options with Kristie Woodardsins. Will plan for IBR with fat grafting in December. She understands the limitations of implant based reconstructionin the setting of radiation, however desires to proceed with this to limit any donor site issues. Will schedule. I received a surgery letter. I spoke with Nolan WOLFE at [facility] ([phone]) to see if CPT Code ( [phone], [address], [phone] or 51866 require pre certification. CPT Codes [phone] and 13088 do not require pre certification, but CPT Code 61908 and 08665 do, which I started during our call. I will fax clinicals to [phone]. I will scan the information that I faxed and the fax success into Epic under the media tab. Pending Case # [id number]    Date Range 9-7-2023 to 12-    I spoke with the patient at the home number listed to provide an insurance update. I will leave this phone note open.

## 2023-09-25 NOTE — TELEPHONE ENCOUNTER
I received a fax from [facility] dated 9-. I will scan the letter into Epic under the media tab. APPROVED  Reference # [id number]  CPT Code [id number], [id number]  Date Range 9- to 9-    I spoke with the patient at the home number listed. The patient is now scheduled for surgery with  on     The patient is aware of H&P. The patient is scheduled for her post op appointment 12-. I will submit the surgery letter to [facility] today. I will mail the surgery information and instructions to the patient today. I will close this phone note.

## 2023-09-27 NOTE — PROGRESS NOTES
----- Message from Jorge Jang LPN sent at 9/27/2023  9:31 AM CDT -----  Contact: Gretchen    ----- Message -----  From: Meka Galeas  Sent: 9/27/2023   9:20 AM CDT  To: Brandon CHARLES Staff    Patient is calling to speak with a nurse regarding appt . States running out of NIFEdipine (PROCARDIA-XL) 30 MG (OSM) 24 hr tablet . Please give a call back at [phone]        Pt taken back to OR. Dr Isabel Chadwick and Izabela [facility] Rn traveling with patient.

## 2023-11-17 ENCOUNTER — TELEPHONE (OUTPATIENT)
Dept: SURGERY | Age: 48
End: 2023-11-17

## 2023-11-17 NOTE — TELEPHONE ENCOUNTER
I returned the patients call mentioned below. I advised her that she will need to check with her PCP or prescribing physician in regards to medications prior to surgery. I will close this phone note.

## 2023-11-17 NOTE — TELEPHONE ENCOUNTER
The patient called to go over her medications that she can and can not take prior to surgery on 12/14/23.     Please call: [phone]

## 2023-12-05 ENCOUNTER — ANESTHESIA EVENT (OUTPATIENT)
Dept: OPERATING ROOM | Age: 48
End: 2023-12-05
Payer: COMMERCIAL

## 2023-12-12 RX ORDER — GLUCOSAMINE/D3/BOSWELLIA SERRA 1500MG-400
10000 TABLET ORAL 2 TIMES DAILY
COMMUNITY

## 2023-12-12 RX ORDER — ABEMACICLIB 100 MG/1
100 TABLET ORAL 2 TIMES DAILY
COMMUNITY

## 2023-12-12 NOTE — PROGRESS NOTES
[facility] PRE-SURGICAL TESTING INSTRUCTIONS                      PRIOR TO PROCEDURE DATE:    1. PLEASE FOLLOW ANY INSTRUCTIONS GIVEN TO YOU PER YOUR SURGEON. 2. Arrange for someone to drive you home and be with you for the first 24 hours after discharge for your safety after your procedure for which you received sedation. Ensure it is someone we can share information with regarding your discharge. NOTE: At this time ONLY 2 ADULTS may accompany you   One person ENCOURAGED to stay at hospital entire time if outpatient surgery      3. You must contact your surgeon for instructions IF:  You are taking any blood thinners, aspirin, anti-inflammatory or vitamins. There is a change in your physical condition such as a cold, fever, rash, cuts, sores, or any other infection, especially near your surgical site. 4. Do not drink alcohol the day before or day of your procedure. Do not use any recreational marijuana at least 24 hours or street drugs (heroin, cocaine) at minimum 5 days prior to your procedure. 5. A Pre-Surgical History and Physical MUST be completed WITHIN 30 DAYS OR LESS prior to your procedure. by your Physician or an Urgent Care        THE DAY OF YOUR PROCEDURE:  1. Follow instructions for ARRIVAL TIME as DIRECTED BY YOUR SURGEON. 2. Enter the MAIN entrance from [facility] and follow the signs to the free Parking [employer] or [employer] (offered free of charge 7 am-5pm). 3. Enter the Main Entrance of the hospital (do not enter from the lower level of the parking garage). Upon entrance, check in with the  at the surgical information desk on your LEFT. Bring your insurance card and photo ID to register      4. DO NOT EAT ANYTHING 8 hours prior to arrival for surgery. You may have up to 8 ounces of water 4 hours prior to your arrival for surgery.    NOTE: ALL Gastric, Bariatric & Bowel surgery patients - you MUST follow your surgeon's instructions regarding

## 2023-12-12 NOTE — PROGRESS NOTES
Patient scheduled for breast surgery and port removal on 12-. In speaking with her, patient indicated that she has a significant fear of needles. Dr. Trenton Peterson and Dr. Roxann Rodrigez are both aware of this issue. A plan was developed by Dr. Trenton Peterson and Dr. Roxann Rodrigez for the morning of her surgery:  Patient is to come in at 0900 to begin the admission process. Once she signs her documents with regard to surgery, she will take 2 Ativan tablets. Her partner Dawn Duff was approved by Dr. Trenton Peterson and Dr. Roxann Rodrigez to be with her for every step of the SDS process. Once she is back in SDS and calmed some from the Ativan, her port can be accessed. She is to go to surgery and be put to sleep and then a peripheral IV is to be started before her port can be de-accessed and removed as surgically planned. Ms. Amy Wiley repeatedly stated that if this process is not respected, she will be a very difficult individual to deal with because of her fear of needles.   SC

## 2023-12-14 ENCOUNTER — ANESTHESIA (OUTPATIENT)
Dept: OPERATING ROOM | Age: 48
End: 2023-12-14
Payer: COMMERCIAL

## 2023-12-14 ENCOUNTER — HOSPITAL ENCOUNTER (OUTPATIENT)
Age: 48
Setting detail: OUTPATIENT SURGERY
Discharge: HOME | End: 2023-12-14
Attending: SURGERY | Admitting: SURGERY
Payer: COMMERCIAL

## 2023-12-14 VITALS
DIASTOLIC BLOOD PRESSURE: 69 MMHG | HEART RATE: 92 BPM | TEMPERATURE: 96.9 F | OXYGEN SATURATION: 96 % | SYSTOLIC BLOOD PRESSURE: 100 MMHG | RESPIRATION RATE: 16 BRPM | BODY MASS INDEX: 33.24 KG/M2 | WEIGHT: 187.6 LBS | HEIGHT: 63 IN

## 2023-12-14 DIAGNOSIS — Z98.890 STATUS POST BREAST RECONSTRUCTION: ICD-10-CM

## 2023-12-14 DIAGNOSIS — C50.912 MALIGNANT NEOPLASM OF LEFT BREAST IN FEMALE, ESTROGEN RECEPTOR POSITIVE, UNSPECIFIED SITE OF BREAST (HCC): ICD-10-CM

## 2023-12-14 DIAGNOSIS — Z17.0 MALIGNANT NEOPLASM OF LEFT BREAST IN FEMALE, ESTROGEN RECEPTOR POSITIVE, UNSPECIFIED SITE OF BREAST (HCC): ICD-10-CM

## 2023-12-14 PROCEDURE — 6360000002 HC RX W HCPCS: Performed by: ANESTHESIOLOGY

## 2023-12-14 PROCEDURE — 3700000000 HC ANESTHESIA ATTENDED CARE: Performed by: SURGERY

## 2023-12-14 PROCEDURE — 2500000003 HC RX 250 WO HCPCS: Performed by: SURGERY

## 2023-12-14 PROCEDURE — 3700000001 HC ADD 15 MINUTES (ANESTHESIA): Performed by: SURGERY

## 2023-12-14 PROCEDURE — 3600000004 HC SURGERY LEVEL 4 BASE: Performed by: SURGERY

## 2023-12-14 PROCEDURE — 3600000014 HC SURGERY LEVEL 4 ADDTL 15MIN: Performed by: SURGERY

## 2023-12-14 PROCEDURE — 7100000000 HC PACU RECOVERY - FIRST 15 MIN: Performed by: SURGERY

## 2023-12-14 PROCEDURE — 6360000002 HC RX W HCPCS: Performed by: NURSE ANESTHETIST, CERTIFIED REGISTERED

## 2023-12-14 PROCEDURE — 2500000003 HC RX 250 WO HCPCS: Performed by: NURSE ANESTHETIST, CERTIFIED REGISTERED

## 2023-12-14 PROCEDURE — 2720000010 HC SURG SUPPLY STERILE: Performed by: SURGERY

## 2023-12-14 PROCEDURE — A4217 STERILE WATER/SALINE, 500 ML: HCPCS | Performed by: SURGERY

## 2023-12-14 PROCEDURE — 7100000001 HC PACU RECOVERY - ADDTL 15 MIN: Performed by: SURGERY

## 2023-12-14 PROCEDURE — C1789 PROSTHESIS, BREAST, IMP: HCPCS | Performed by: SURGERY

## 2023-12-14 PROCEDURE — 2580000003 HC RX 258: Performed by: SURGERY

## 2023-12-14 PROCEDURE — 2709999900 HC NON-CHARGEABLE SUPPLY: Performed by: SURGERY

## 2023-12-14 PROCEDURE — 6360000002 HC RX W HCPCS: Performed by: SURGERY

## 2023-12-14 PROCEDURE — 7100000010 HC PHASE II RECOVERY - FIRST 15 MIN: Performed by: SURGERY

## 2023-12-14 PROCEDURE — 7100000011 HC PHASE II RECOVERY - ADDTL 15 MIN: Performed by: SURGERY

## 2023-12-14 DEVICE — IMPLANTABLE DEVICE: Type: IMPLANTABLE DEVICE | Site: BREAST | Status: FUNCTIONAL

## 2023-12-14 RX ORDER — METHOCARBAMOL 100 MG/ML
INJECTION, SOLUTION INTRAMUSCULAR; INTRAVENOUS PRN
Status: DISCONTINUED | OUTPATIENT
Start: 2023-12-14 | End: 2023-12-14 | Stop reason: SDUPTHER

## 2023-12-14 RX ORDER — FENTANYL CITRATE 50 UG/ML
25 INJECTION, SOLUTION INTRAMUSCULAR; INTRAVENOUS EVERY 5 MIN PRN
Status: COMPLETED | OUTPATIENT
Start: 2023-12-14 | End: 2023-12-14

## 2023-12-14 RX ORDER — OXYCODONE HYDROCHLORIDE AND ACETAMINOPHEN 5; 325 MG/1; MG/1
1 TABLET ORAL EVERY 6 HOURS PRN
Qty: 28 TABLET | Refills: 0 | Status: SHIPPED | OUTPATIENT
Start: 2023-12-14 | End: 2023-12-21

## 2023-12-14 RX ORDER — HYDROMORPHONE HYDROCHLORIDE 1 MG/ML
0.5 INJECTION, SOLUTION INTRAMUSCULAR; INTRAVENOUS; SUBCUTANEOUS EVERY 5 MIN PRN
Status: DISCONTINUED | OUTPATIENT
Start: 2023-12-14 | End: 2023-12-14 | Stop reason: HOSPADM

## 2023-12-14 RX ORDER — SODIUM CHLORIDE 9 MG/ML
INJECTION, SOLUTION INTRAVENOUS PRN
Status: DISCONTINUED | OUTPATIENT
Start: 2023-12-14 | End: 2023-12-14 | Stop reason: HOSPADM

## 2023-12-14 RX ORDER — HYDROMORPHONE HYDROCHLORIDE 2 MG/ML
INJECTION, SOLUTION INTRAMUSCULAR; INTRAVENOUS; SUBCUTANEOUS PRN
Status: DISCONTINUED | OUTPATIENT
Start: 2023-12-14 | End: 2023-12-14 | Stop reason: SDUPTHER

## 2023-12-14 RX ORDER — SODIUM CHLORIDE, SODIUM LACTATE, POTASSIUM CHLORIDE, CALCIUM CHLORIDE 600; 310; 30; 20 MG/100ML; MG/100ML; MG/100ML; MG/100ML
INJECTION, SOLUTION INTRAVENOUS CONTINUOUS
Status: DISCONTINUED | OUTPATIENT
Start: 2023-12-14 | End: 2023-12-14 | Stop reason: HOSPADM

## 2023-12-14 RX ORDER — ONDANSETRON 2 MG/ML
INJECTION INTRAMUSCULAR; INTRAVENOUS PRN
Status: DISCONTINUED | OUTPATIENT
Start: 2023-12-14 | End: 2023-12-14 | Stop reason: SDUPTHER

## 2023-12-14 RX ORDER — ROCURONIUM BROMIDE 10 MG/ML
INJECTION, SOLUTION INTRAVENOUS PRN
Status: DISCONTINUED | OUTPATIENT
Start: 2023-12-14 | End: 2023-12-14 | Stop reason: SDUPTHER

## 2023-12-14 RX ORDER — LABETALOL HYDROCHLORIDE 5 MG/ML
10 INJECTION, SOLUTION INTRAVENOUS
Status: DISCONTINUED | OUTPATIENT
Start: 2023-12-14 | End: 2023-12-14 | Stop reason: HOSPADM

## 2023-12-14 RX ORDER — MIDAZOLAM HYDROCHLORIDE 1 MG/ML
INJECTION INTRAMUSCULAR; INTRAVENOUS
Status: COMPLETED
Start: 2023-12-14 | End: 2023-12-14

## 2023-12-14 RX ORDER — IPRATROPIUM BROMIDE AND ALBUTEROL SULFATE 2.5; .5 MG/3ML; MG/3ML
1 SOLUTION RESPIRATORY (INHALATION)
Status: DISCONTINUED | OUTPATIENT
Start: 2023-12-14 | End: 2023-12-14 | Stop reason: HOSPADM

## 2023-12-14 RX ORDER — ACETAMINOPHEN 325 MG/1
650 TABLET ORAL
Status: DISCONTINUED | OUTPATIENT
Start: 2023-12-14 | End: 2023-12-14 | Stop reason: HOSPADM

## 2023-12-14 RX ORDER — PROPOFOL 10 MG/ML
INJECTION, EMULSION INTRAVENOUS PRN
Status: DISCONTINUED | OUTPATIENT
Start: 2023-12-14 | End: 2023-12-14 | Stop reason: SDUPTHER

## 2023-12-14 RX ORDER — CLINDAMYCIN PHOSPHATE 600 MG/50ML
600 INJECTION INTRAVENOUS ONCE
Status: COMPLETED | OUTPATIENT
Start: 2023-12-14 | End: 2023-12-14

## 2023-12-14 RX ORDER — FAMOTIDINE 10 MG/ML
INJECTION, SOLUTION INTRAVENOUS PRN
Status: DISCONTINUED | OUTPATIENT
Start: 2023-12-14 | End: 2023-12-14 | Stop reason: SDUPTHER

## 2023-12-14 RX ORDER — SODIUM CHLORIDE 0.9 % (FLUSH) 0.9 %
5-40 SYRINGE (ML) INJECTION PRN
Status: DISCONTINUED | OUTPATIENT
Start: 2023-12-14 | End: 2023-12-14 | Stop reason: HOSPADM

## 2023-12-14 RX ORDER — DOCUSATE SODIUM 100 MG/1
100 CAPSULE, LIQUID FILLED ORAL 2 TIMES DAILY
Qty: 30 CAPSULE | Refills: 0 | Status: SHIPPED | OUTPATIENT
Start: 2023-12-14

## 2023-12-14 RX ORDER — MIDAZOLAM HYDROCHLORIDE 1 MG/ML
INJECTION INTRAMUSCULAR; INTRAVENOUS PRN
Status: DISCONTINUED | OUTPATIENT
Start: 2023-12-14 | End: 2023-12-14 | Stop reason: SDUPTHER

## 2023-12-14 RX ORDER — PROCHLORPERAZINE EDISYLATE 5 MG/ML
5 INJECTION INTRAMUSCULAR; INTRAVENOUS
Status: DISCONTINUED | OUTPATIENT
Start: 2023-12-14 | End: 2023-12-14 | Stop reason: HOSPADM

## 2023-12-14 RX ORDER — ONDANSETRON 2 MG/ML
4 INJECTION INTRAMUSCULAR; INTRAVENOUS
Status: DISCONTINUED | OUTPATIENT
Start: 2023-12-14 | End: 2023-12-14 | Stop reason: HOSPADM

## 2023-12-14 RX ORDER — MAGNESIUM HYDROXIDE 1200 MG/15ML
LIQUID ORAL CONTINUOUS PRN
Status: DISCONTINUED | OUTPATIENT
Start: 2023-12-14 | End: 2023-12-14 | Stop reason: HOSPADM

## 2023-12-14 RX ORDER — SODIUM CHLORIDE, SODIUM LACTATE, POTASSIUM CHLORIDE, AND CALCIUM CHLORIDE .6; .31; .03; .02 G/100ML; G/100ML; G/100ML; G/100ML
IRRIGANT IRRIGATION PRN
Status: DISCONTINUED | OUTPATIENT
Start: 2023-12-14 | End: 2023-12-14 | Stop reason: HOSPADM

## 2023-12-14 RX ORDER — SODIUM CHLORIDE 0.9 % (FLUSH) 0.9 %
5-40 SYRINGE (ML) INJECTION EVERY 12 HOURS SCHEDULED
Status: DISCONTINUED | OUTPATIENT
Start: 2023-12-14 | End: 2023-12-14 | Stop reason: HOSPADM

## 2023-12-14 RX ADMIN — ROCURONIUM BROMIDE 100 MG: 10 INJECTION, SOLUTION INTRAVENOUS at 09:17

## 2023-12-14 RX ADMIN — FENTANYL CITRATE 25 MCG: 50 INJECTION INTRAMUSCULAR; INTRAVENOUS at 11:37

## 2023-12-14 RX ADMIN — ONDANSETRON 8 MG: 2 INJECTION INTRAMUSCULAR; INTRAVENOUS at 09:15

## 2023-12-14 RX ADMIN — TRANEXAMIC ACID 1000 MG: 100 INJECTION, SOLUTION INTRAVENOUS at 09:26

## 2023-12-14 RX ADMIN — HYDROMORPHONE HYDROCHLORIDE 0.5 MG: 1 INJECTION, SOLUTION INTRAMUSCULAR; INTRAVENOUS; SUBCUTANEOUS at 12:10

## 2023-12-14 RX ADMIN — SODIUM CHLORIDE, SODIUM LACTATE, POTASSIUM CHLORIDE, AND CALCIUM CHLORIDE: .6; .31; .03; .02 INJECTION, SOLUTION INTRAVENOUS at 09:09

## 2023-12-14 RX ADMIN — CLINDAMYCIN PHOSPHATE 600 MG: 600 INJECTION, SOLUTION INTRAVENOUS at 09:23

## 2023-12-14 RX ADMIN — FENTANYL CITRATE 25 MCG: 50 INJECTION INTRAMUSCULAR; INTRAVENOUS at 11:47

## 2023-12-14 RX ADMIN — SUGAMMADEX 200 MG: 100 INJECTION, SOLUTION INTRAVENOUS at 10:56

## 2023-12-14 RX ADMIN — HYDROMORPHONE HYDROCHLORIDE 0.5 MG: 1 INJECTION, SOLUTION INTRAMUSCULAR; INTRAVENOUS; SUBCUTANEOUS at 11:37

## 2023-12-14 RX ADMIN — METHOCARBAMOL 1000 MG: 100 INJECTION, SOLUTION INTRAMUSCULAR; INTRAVENOUS at 09:21

## 2023-12-14 RX ADMIN — MIDAZOLAM HYDROCHLORIDE 2 MG: 2 INJECTION, SOLUTION INTRAMUSCULAR; INTRAVENOUS at 09:08

## 2023-12-14 RX ADMIN — HYDROMORPHONE HYDROCHLORIDE 2 MG: 2 INJECTION, SOLUTION INTRAMUSCULAR; INTRAVENOUS; SUBCUTANEOUS at 09:16

## 2023-12-14 RX ADMIN — PROPOFOL 170 MG: 10 INJECTION, EMULSION INTRAVENOUS at 09:16

## 2023-12-14 RX ADMIN — PROPOFOL 40 MG: 10 INJECTION, EMULSION INTRAVENOUS at 10:46

## 2023-12-14 RX ADMIN — SODIUM CHLORIDE, SODIUM LACTATE, POTASSIUM CHLORIDE, AND CALCIUM CHLORIDE: .6; .31; .03; .02 INJECTION, SOLUTION INTRAVENOUS at 09:43

## 2023-12-14 RX ADMIN — FAMOTIDINE 20 MG: 10 INJECTION, SOLUTION INTRAVENOUS at 09:15

## 2023-12-14 ASSESSMENT — PAIN DESCRIPTION - DESCRIPTORS
DESCRIPTORS: ACHING

## 2023-12-14 ASSESSMENT — PAIN SCALES - GENERAL
PAINLEVEL_OUTOF10: 3
PAINLEVEL_OUTOF10: 0
PAINLEVEL_OUTOF10: 5
PAINLEVEL_OUTOF10: 0
PAINLEVEL_OUTOF10: 2
PAINLEVEL_OUTOF10: 7
PAINLEVEL_OUTOF10: 4

## 2023-12-14 ASSESSMENT — PAIN - FUNCTIONAL ASSESSMENT
PAIN_FUNCTIONAL_ASSESSMENT: ACTIVITIES ARE NOT PREVENTED
PAIN_FUNCTIONAL_ASSESSMENT: ACTIVITIES ARE NOT PREVENTED

## 2023-12-14 ASSESSMENT — PAIN DESCRIPTION - LOCATION
LOCATION: SHOULDER
LOCATION: ABDOMEN
LOCATION: ABDOMEN
LOCATION: SHOULDER

## 2023-12-14 ASSESSMENT — PAIN DESCRIPTION - FREQUENCY: FREQUENCY: CONTINUOUS

## 2023-12-14 ASSESSMENT — PAIN DESCRIPTION - ORIENTATION
ORIENTATION: RIGHT
ORIENTATION: RIGHT
ORIENTATION: ANTERIOR;MID

## 2023-12-14 ASSESSMENT — PAIN DESCRIPTION - ONSET: ONSET: ON-GOING

## 2023-12-14 ASSESSMENT — PAIN DESCRIPTION - PAIN TYPE: TYPE: SURGICAL PAIN

## 2023-12-14 NOTE — ANESTHESIA POSTPROCEDURE EVALUATION
Department of Anesthesiology  Postprocedure Note    Patient: Alicia Carreon  MRN: [id number]  YOB: 1975  Date of evaluation: 12/14/2023    Procedure Summary       Date: 12/14/23 Room / Location: [address] [address] [facility]    Anesthesia Start: 0912 Anesthesia Stop: 1113    Procedures:       EXCHANGE FOR PERMANENT IMPLANTS BILATERAL BREASTS; LEFT BREAST CAPSULECTOMIES; REVISION LEFT BREAST RECONSTRUCTION (Bilateral)      BILATERAL BREAST FAT GRAFTING (Bilateral)      REMOVAL OF MEDIPORT Diagnosis:       Malignant neoplasm of left breast in female, estrogen receptor positive, unspecified site of breast ([address])      Status post breast reconstruction      (Malignant neoplasm of left breast in female, estrogen receptor positive, unspecified site of breast ([address]) [C50.912, Z17.0])      (Status post breast reconstruction [I90.991])    Surgeons: Nicanor Nicholas MD Responsible Provider: Ghazala Tadeo MD    Anesthesia Type: general ASA Status: 2            Anesthesia Type: No value filed. Holli Phase I: Holli Score: 8    Holli Phase II:      Anesthesia Post Evaluation    Patient location during evaluation: PACU  Patient participation: complete - patient participated  Level of consciousness: awake  Pain score: 0  Nausea & Vomiting: no nausea and no vomiting  Cardiovascular status: blood pressure returned to baseline  Respiratory status: acceptable  Hydration status: euvolemic  Pain management: adequate    No notable events documented.

## 2023-12-14 NOTE — PROGRESS NOTES
Pt received from OR to PACU # 9 via stretcher. Post: Procedure(s):  EXCHANGE FOR PERMANENT IMPLANTS BILATERAL BREASTS; LEFT BREAST CAPSULECTOMIES; REVISION LEFT BREAST RECONSTRUCTION  BILATERAL BREAST FAT GRAFTING  REMOVAL OF MEDIPORT     Report received from OR RN and RENÉE Weller. Per report pt did well. Respirations reg and easy. Pt is drowsy. Attached to PACU monitoring system. Alarms and parameters set    Pain none and no complaints of nausea.

## 2023-12-14 NOTE — PERIOP NOTE
Pt still drops her sats to the high 80's when I leave her alone and she falls to sleep. Quickly rebounds with waking. Dr. Montgomery Knows at bedside and said, \"Just give it time. \"

## 2023-12-14 NOTE — ANESTHESIA PRE PROCEDURE
Department of Anesthesiology  Preprocedure Note       Name:  Rima Sandra   Age:  53 y.o.  :  1975                                          MRN:  [id number]         Date:  2023      Surgeon: Robert León):  Kennedy Mayer MD    Procedure: Procedure(s):  EXCHANGE FOR PERMANENT IMPLANTS BILATERAL BREASTS; BILATERAL BREAST CAPSULECTOMIES;  BILATERAL BREAST FAT GRAFTING  REMOVAL OF MEDIPORT    Medications prior to admission:   Prior to Admission medications    Medication Sig Start Date End Date Taking?  Authorizing Provider   Biotin 05602 MCG TABS Take 10,000 mcg by mouth in the morning and at bedtime   Yes Emir Amanda MD   Abemaciclib (VERZENIO) 100 MG TABS Take 100 mg by mouth in the morning and at bedtime   Yes Emir Amanda MD   gabapentin (NEURONTIN) 300 MG capsule TAKE ONE CAPSULE BY MOUTH EVERY MORNING AND EVERY EVENING 23   Emir Amanda MD   meloxicam (MOBIC) 7.5 MG tablet  23   Emir Amanda MD   oxybutynin (DITROPAN) 5 MG tablet Take 1 tablet by mouth 2 times daily 23   Emir Amanda MD   letrozole [facility]) 2.5 MG tablet Take 1 tablet by mouth nightly 23   Emir Amanda MD   leuprolide (LUPRON) 3.75 MG injection Inject 7.5 mg into the muscle every 30 days 3/10/23   Emir Amanda MD   DULoxetine (CYMBALTA) 60 MG extended release capsule Take 1 capsule by mouth nightly 23   Emir Amanda MD   Misc Natural Products (RELIZEN) TABS Take 2 tablets by mouth daily    Emir Amanda MD   Probiotic Product (RA PROBIOTIC GUMMIES PO) Take by mouth  Patient not taking: Reported on 2023    Emir Amanda MD   vitamin C (ASCORBIC ACID) 500 MG tablet Take 1 tablet by mouth 2 times daily    Emir Amanda MD   vitamin B-12 (CYANOCOBALAMIN) 100 MCG tablet Take 0.5 tablets by mouth daily    Emir Amanda MD   traZODone (DESYREL) 50 MG tablet Take 1 tablet by mouth nightly Mostly takes

## 2023-12-14 NOTE — PROGRESS NOTES
PACU Transfer to [facility]    Vitals:    12/14/23 1332   BP:    Pulse: 96   Resp: 10   Temp:    SpO2: 99%         Intake/Output Summary (Last 24 hours) at 12/14/2023 1333  Last data filed at 12/14/2023 1106  Gross per 24 hour   Intake 1960 ml   Output --   Net 1960 ml       Pain assessment:  present - adequately treated  Pain Level: 2    Patient transferred via stretcher per Oley Basket to care of [facility] RN.

## 2023-12-14 NOTE — PROGRESS NOTES
Pt Jamey Sprague informed that he needs to access port. Refusal for pregnancy test has been signed by the pt.

## 2023-12-14 NOTE — BRIEF OP NOTE
Brief Postoperative Note      Patient: Bibiana Escobar  YOB: 1975  MRN: [id number]    Date of Procedure: 12/14/2023    Pre-Op Diagnosis Codes:     * Malignant neoplasm of left breast in female, estrogen receptor positive, unspecified site of breast ([address]) [C50.912, Z17.0]     * Status post breast reconstruction [Z98.890]    Post-Op Diagnosis: Post-Op Diagnosis Codes:     * Malignant neoplasm of left breast in female, estrogen receptor positive, unspecified site of breast ([address]) [C50.912, Z17.0]     * Status post breast reconstruction [Z98.890]       Procedure(s):  EXCHANGE FOR PERMANENT IMPLANTS BILATERAL BREASTS; LEFT BREAST CAPSULECTOMIES; REVISION LEFT BREAST RECONSTRUCTION  BILATERAL BREAST FAT GRAFTING  REMOVAL OF MEDIPORT    Surgeon(s):  Fiona Ernandez MD    Assistant:  Surgical Assistant: Dionisio Robert  Resident: Nagi Gallegos DO    Anesthesia: General    Estimated Blood Loss (mL): Minimal    Complications: None    Specimens:   ID Type Source Tests Collected by Time Destination   B : left breast capsule Tissue Tissue SURGICAL PATHOLOGY Fiona Ernandez MD 12/14/2023 9159    C : port for gross only [address] Fiona Ernandez MD 12/14/2023 2420    D : right breast expander for gross only [employer] [address] Fiona Ernandez MD 12/14/2023 1305    E : left breast expander for gross only [address] Fiona Ernandez MD 12/14/2023 5712        Implants:  * No implants in log *      Drains:   [REMOVED] Urinary Catheter 12/14/23 Birch (Removed)       Findings:   Right IJ mediport removed, catheter intact. Capsule of mediport excised  BL tissue expanders removed, left breast capsulectomy   Left breast reconstruction revision.    Placement of permanent implant BL   Liposuction and Fat grafting BL  Hemostasis achieved       Electronically signed by Nagi Gallegos DO on 12/14/2023 at 10:53 AM

## 2023-12-14 NOTE — INTERVAL H&P NOTE
Update History & Physical    The patient's History and Physical of November 20, 2023 was reviewed with the patient and I examined the patient. There was no change. The surgical site was confirmed by the patient and me. Plan: The risks, benefits, expected outcome, and alternative to the recommended procedure have been discussed with the patient. Patient understands and wants to proceed with the procedure.      Electronically signed by Kiera Patton DO on 12/14/2023 at 9:09 AM

## 2023-12-14 NOTE — PERIOP NOTE
Took pt for a walk to the bathroom, and she has a steady gait. Upon coming back to the bed her sats on room air was 78%. O2 reapplied and pt taking a nap. Pt quickly resats to the high 90's.

## 2023-12-14 NOTE — PROGRESS NOTES
[id number] Dr. Darshan Chilel informed pt signed refusal of pregnancy test and \"OK to take 2mg Ativan orally from home\", after signing consents. Dr. Alycia Rodríguez" is to access port a cath upon pt request and via Dr. Diane Tinoco orders.  Partner at bedside

## 2023-12-15 ENCOUNTER — TELEPHONE (OUTPATIENT)
Dept: SURGERY | Age: 48
End: 2023-12-15

## 2023-12-15 RX ORDER — CLINDAMYCIN HYDROCHLORIDE 300 MG/1
300 CAPSULE ORAL 3 TIMES DAILY
Qty: 30 CAPSULE | Refills: 0 | Status: SHIPPED | OUTPATIENT
Start: 2023-12-15 | End: 2023-12-25

## 2023-12-15 NOTE — TELEPHONE ENCOUNTER
Post Op Call    Patient is 1 days out from bilateral breast reconstruction w/ DTI surgery     Patient states that he/she is felling: itchy, taking benadryl. Pain controlled with medications    Discharge medications include antibiotics and pain medications: antibiotic ordered today. Has pain medication        Any signs of Fevers greater than 101. 9? Chills? Redness? Warmth? Increased Swelling of tissue? no    Does patient have drains in place? no       Does patient have vac in place?  no                       Reinforce restrictions and use of compression/ dressings : yes, wearing    Patient has post-op appointment scheduled for: 12/21/2023

## 2024-01-16 NOTE — PROGRESS NOTES
[facility]    PROCEDURE: 1) Removal of right chest Mediport  2) Exchange for permanent implants bilateral breasts                          3) Left breast capsulectomy                          4) Bilateral breast high volume fat grafting                          5) Revision of left breast reconstruction  DATE: 12/14/23    Spring Salmeron has been recovering well since her procedure. Pain has been well controlled without pain medications.     EXAM    /85   Pulse 74   Temp 97.3 °F (36.3 °C)   Wt 89.4 kg (197 lb)   SpO2 98%   BMI 34.90 kg/m²     GEN: NAD   BREAST: Incisions healed  Right breast with excellent contour  Left breast with significant radiation changes   ABD: Good contour    IMP: 48 y.o.female s/p IBR  PLAN: Overall she feels well and notes that her radiated left breast feels tighter as expected. She will return in 6 months to determine if she desires to have any intervention (at this time she states that she likely does not want to do anything).     Vahid Dunbar MD  [facility]  [phone]  01/17/24

## 2024-01-17 ENCOUNTER — OFFICE VISIT (OUTPATIENT)
Dept: SURGERY | Age: 49
End: 2024-01-17

## 2024-01-17 VITALS
BODY MASS INDEX: 34.9 KG/M2 | OXYGEN SATURATION: 98 % | SYSTOLIC BLOOD PRESSURE: 131 MMHG | WEIGHT: 197 LBS | HEART RATE: 74 BPM | TEMPERATURE: 97.3 F | DIASTOLIC BLOOD PRESSURE: 85 MMHG

## 2024-01-17 DIAGNOSIS — Z09 POSTOP CHECK: Primary | ICD-10-CM

## 2024-01-17 PROCEDURE — 99024 POSTOP FOLLOW-UP VISIT: CPT | Performed by: SURGERY

## 2024-07-16 NOTE — PROGRESS NOTES
for new gait problem, joint swelling and myalgias.   Skin: Negative for color change, pallor and rash.   Endocrine: negative for tremors, temperature intolerance or polydipsia.  Allergic/Immunologic: Negative for new environmental or food allergies.  Neurological: Negative for dizziness, seizures, speech difficulty, numbness.   Hematological: Negative for adenopathy.   Psychiatric/Behavioral: Negative for agitation and confusion.    EXAM    /89 (Site: Right Upper Arm, Position: Sitting, Cuff Size: Medium Adult)   Pulse 92   Temp 97.8 °F (36.6 °C) (Temporal)   Resp 16   Ht 1.6 m (5' 2.99\")   Wt 90 kg (198 lb 6.4 oz)   LMP  (LMP Unknown)   BMI 35.15 kg/m²      GEN: NAD   BREAST: Incisions healed  Right breast with excellent contour  Left breast with significant radiation changes   ABD: Good contour    IMP: 48 y.o.female s/p IBR  PLAN: Overall she feels well and notes that her radiated left breast feels tighter as expected. She is happy with her results and will follow-up in 1 year. She can take Tylenol/Motrin for discomfort.    Vahid Dunbar MD  [facility]  [phone]  07/17/24

## 2024-07-17 ENCOUNTER — OFFICE VISIT (OUTPATIENT)
Dept: SURGERY | Age: 49
End: 2024-07-17
Payer: COMMERCIAL

## 2024-07-17 VITALS
WEIGHT: 198.4 LBS | DIASTOLIC BLOOD PRESSURE: 89 MMHG | BODY MASS INDEX: 35.15 KG/M2 | RESPIRATION RATE: 16 BRPM | HEIGHT: 63 IN | SYSTOLIC BLOOD PRESSURE: 114 MMHG | HEART RATE: 92 BPM | TEMPERATURE: 97.8 F

## 2024-07-17 DIAGNOSIS — Z09 POSTOP CHECK: Primary | ICD-10-CM

## 2024-07-17 PROCEDURE — 99213 OFFICE O/P EST LOW 20 MIN: CPT | Performed by: SURGERY

## 2025-07-30 ENCOUNTER — OFFICE VISIT (OUTPATIENT)
Dept: SURGERY | Age: 50
End: 2025-07-30
Payer: COMMERCIAL

## 2025-07-30 VITALS
DIASTOLIC BLOOD PRESSURE: 95 MMHG | TEMPERATURE: 97.8 F | OXYGEN SATURATION: 98 % | WEIGHT: 189 LBS | HEART RATE: 83 BPM | SYSTOLIC BLOOD PRESSURE: 121 MMHG | BODY MASS INDEX: 33.49 KG/M2

## 2025-07-30 DIAGNOSIS — Z09 POSTOP CHECK: Primary | ICD-10-CM

## 2025-07-30 PROCEDURE — 99213 OFFICE O/P EST LOW 20 MIN: CPT | Performed by: SURGERY

## 2025-07-30 NOTE — PROGRESS NOTES
[facility]    PROCEDURE: 1) Removal of right chest Mediport  2) Exchange for permanent implants bilateral breasts                          3) Left breast capsulectomy                          4) Bilateral breast high volume fat grafting                          5) Revision of left breast reconstruction  DATE: 12/14/23    Spring Salmeron has been recovering well since her procedure. Pain has been well controlled without pain medications.     Since her last evaluation, she has been feeling well.    PMHx:   Past Medical History:   Diagnosis Date    Anxiety     Severe fear of needles    Cancer of overlapping sites of left breast (HCC) 03/15/2022    Prolonged emergence from general anesthesia     Sleep difficulties     Wears glasses      PSHx:   Past Surgical History:   Procedure Laterality Date    BREAST ENHANCEMENT SURGERY Bilateral 9/30/2022    BILATERAL BREAST RECONSTRUCTION WITH STAGE 1 TISSUE EXPANDER PLACEMENT WITH ALLODERM AND CREATION OF AUTODERM FLAPS performed by Penelope Dunbar MD at [facility] OR    BREAST SURGERY Bilateral 12/14/2023    EXCHANGE FOR PERMANENT IMPLANTS BILATERAL BREASTS; LEFT BREAST CAPSULECTOMIES; REVISION LEFT BREAST RECONSTRUCTION performed by Penelope Dunbar MD at [facility] OR    FAT TRANSFER Bilateral 12/14/2023    BILATERAL BREAST FAT GRAFTING performed by Penelope Dunbar MD at [facility] OR    MASTECTOMY Right 9/30/2022    RIGHT BREAST SIMPLE MASTECTOMY; performed by Anjelica Dior DO at [facility] OR    MASTECTOMY, MODIFIED RADICAL Left 9/30/2022    LEFT BREAST MODIFIED RADICAL MASTECTOMY, LEFT BREAST SENTINEL NODE BIOPSY, AXILLARY LYMPH NODE DISSECTION WITH LYMPHOSEEK AND LYMPHAZURIN; performed by Anjelica Dior DO at [facility] OR    PORT SURGERY N/A 4/5/2022    Attempted RIGHT subclavian, Right IJ PORT A CATHETER INSERTION, ultrasound guidance performed by Anjelica Dior DO at [facility] OR    PORT SURGERY Right 4/5/2022    REPOSITION OF RIGHT PORT

## (undated) DEVICE — SYRINGE 20ML LL S/C 50

## (undated) DEVICE — GOWN,SIRUS,POLYRNF,BRTHSLV,LG,30/CS: Brand: MEDLINE

## (undated) DEVICE — BLANKET WRM W40.2XL55.9IN IORT LO BODY + MISTRAL AIR

## (undated) DEVICE — GLOVE SURG SZ 75 L12IN FNGR THK94MIL STD WHT LTX FREE

## (undated) DEVICE — DRAIN,WOUND,15FR,3/16,FULL-FLUTED: Brand: MEDLINE

## (undated) DEVICE — TOWEL,STOP FLAG GOLD N-W: Brand: MEDLINE

## (undated) DEVICE — DRAIN SURG 15FR L3 16IN DIA47MM SIL RND HUBLESS FULL FLUT

## (undated) DEVICE — GOWN,SIRUS,POLYRNF,XLN/3XL,18/CS: Brand: MEDLINE

## (undated) DEVICE — SYSTEM LIPO FAT PROC REVOLVE RV001] ALLERGAN USA INC]

## (undated) DEVICE — SYSTEM IMPL DEL FOR BRST IMPL FUN

## (undated) DEVICE — GLOVE ORANGE PI 8 1/2   MSG9085

## (undated) DEVICE — Device: Brand: MEDICAL ACTION INDUSTRIES

## (undated) DEVICE — SUTURE PDS II SZ 2-0 L27IN ABSRB VLT L26MM CT-2 1/2 CIR Z333H

## (undated) DEVICE — 450 ML BOTTLE OF 0.05% CHLORHEXIDINE GLUCONATE IN 99.95% STERILE WATER FOR IRRIGATION, USP AND APPLICATOR.: Brand: IRRISEPT ANTIMICROBIAL WOUND LAVAGE

## (undated) DEVICE — SUTURE PERMA-HAND SZ 2-0 L30IN NONABSORBABLE BLK L26MM SH K833H

## (undated) DEVICE — SYRINGE IRRIG 60ML SFT PLIABLE BLB EZ TO GRP 1 HND USE W/

## (undated) DEVICE — 15' LARGE BORE TUBING W/SILICONE INSERT FOR INFILTRATION PUMP SYSTEMS -SINGLE SPIKE,  BOX OF 10: Brand: INFILTRATION TUBING

## (undated) DEVICE — PAD FOAM 11.75X7-7/8 IN RESTON LF

## (undated) DEVICE — 1LYRTR 16FR10ML100%SIL UMS SNP: Brand: MEDLINE INDUSTRIES, INC.

## (undated) DEVICE — ADHESIVE SKIN CLOSURE WND 8.661X1.5 IN 22 CM LIQUIBAND SECUR

## (undated) DEVICE — GLOVE SURG SZ 75 L12IN THK75MIL DK GRN LTX FREE

## (undated) DEVICE — NEEDLE HYPO 22GA L1.5IN BLK POLYPR HUB S STL REG BVL STR

## (undated) DEVICE — ASPIRATION TUBING SET, DISPOSABLE: Brand: MICROAIRE®

## (undated) DEVICE — Device

## (undated) DEVICE — SYRINGE CATH TIP 50ML

## (undated) DEVICE — TUBING, SUCTION, 9/32" X 12', STRAIGHT: Brand: MEDLINE INDUSTRIES, INC.

## (undated) DEVICE — SUTURE PDS II SZ 3-0 L27IN ABSRB VLT L26MM SH 1/2 CIR Z316H

## (undated) DEVICE — SYRINGE MED 10ML LUERLOCK TIP W/O SFTY DISP

## (undated) DEVICE — STERILE PVP: Brand: MEDLINE INDUSTRIES, INC.

## (undated) DEVICE — GAUZE FLUFF 1 PLY: Brand: DEROYAL

## (undated) DEVICE — 3M™ IOBAN™ 2 ANTIMICROBIAL INCISE DRAPE 6648EZ: Brand: IOBAN™ 2

## (undated) DEVICE — SYSTEM SKIN CLSR 22CM DERMBND PRINEO

## (undated) DEVICE — SUTURE PERMA-HAND SZ 2-0 L30IN NONABSORBABLE BLK L30MM FSL 679H

## (undated) DEVICE — CLEANER,CAUTERY TIP,2X2",STERILE: Brand: MEDLINE

## (undated) DEVICE — SUTURE VCRL SZ 3-0 L27IN ABSRB UD L26MM SH 1/2 CIR J416H

## (undated) DEVICE — PLASTIC MAJOR: Brand: MEDLINE INDUSTRIES, INC.

## (undated) DEVICE — COVER,MAYO STAND,XL,STERILE: Brand: MEDLINE

## (undated) DEVICE — 6 ML SYRINGE LUER-LOCK TIP: Brand: MONOJECT

## (undated) DEVICE — PORT INSERTION: Brand: MEDLINE INDUSTRIES, INC.

## (undated) DEVICE — DRESSING GERM DIA1IN CNTR H DIA7MM BLU CHG ANTIMIC PROTCT

## (undated) DEVICE — TEXTURED, HIGH PROFILE, SUTURE TABS, INTEGRAL INJECTION DOME, 475CC: Brand: ARTOURA BREAST TISSUE EXPANDER

## (undated) DEVICE — GLOVE SURG SZ 75 L12IN FNGR THK79MIL GRN LTX FREE

## (undated) DEVICE — INTENDED USE FOR SURGICAL MARKING ON INTACT SKIN, ALSO PROVIDES A PERMANENT METHOD OF IDENTIFYING OBJECTS IN THE OPERATING ROOM: Brand: WRITESITE® PLUS MINI PREP RESISTANT MARKER

## (undated) DEVICE — BINDER ABD UNIV H9IN WAIST 30-45IN E SFT COT PREM 3 PNL

## (undated) DEVICE — GLOVE ORANGE PI 7 1/2   MSG9075

## (undated) DEVICE — RESERVOIR,SUCTION,100CC,SILICONE: Brand: MEDLINE

## (undated) DEVICE — 4MM SINGLE USE CANNULA, 10MM PORT, 22CM LONG, TRI-PORT II: Brand: MICROAIRE®

## (undated) DEVICE — INTENDED FOR TISSUE SEPARATION, AND OTHER PROCEDURES THAT REQUIRE A SHARP SURGICAL BLADE TO PUNCTURE OR CUT.: Brand: BARD-PARKER ® CARBON RIB-BACK BLADES

## (undated) DEVICE — SUTURE PDS II SZ 2-0 L27IN ABSRB UD FS-1 L24MM 3/8 CIR REV Z443H

## (undated) DEVICE — CLIP LIG M BLU TI HRT SHP WIRE HORZ 600 PER BX

## (undated) DEVICE — SUTURE PERMAHAND SZ 3-0 L18IN NONABSORBABLE BLK L26MM SH C013D

## (undated) DEVICE — SUTURE MCRYL SZ 4-0 L27IN ABSRB UD L19MM PS-2 1/2 CIR PRIM Y426H

## (undated) DEVICE — GAUZE,SPONGE,4"X4",16PLY,XRAY,STRL,LF: Brand: MEDLINE

## (undated) DEVICE — SPONGE,LAP,18"X18",DLX,XR,ST,5/PK,40/PK: Brand: MEDLINE

## (undated) DEVICE — PADDING CAST W20XL29.8IN FOAM SELF ADH M SUPP LTWT RESTON

## (undated) DEVICE — APPLICATOR MEDICATED 26 CC SOLUTION HI LT ORNG CHLORAPREP

## (undated) DEVICE — PLASMABLADE PS210-030S 3.0S LOCK: Brand: PLASMABLADE™

## (undated) DEVICE — BLADE,CARBON-STEEL,15,STRL,DISPOSABLE,TB: Brand: MEDLINE

## (undated) DEVICE — GENERAL: Brand: MEDLINE INDUSTRIES, INC.

## (undated) DEVICE — SUTURE PLN GUT SZ 5-0 L18IN ABSRB YELLOWISH TAN L13MM PC-1 1915G

## (undated) DEVICE — SHEARS ENDOSCP L9CM CRV HARM FOCS +

## (undated) DEVICE — SUTURE MCRYL SZ 3-0 L27IN ABSRB UD L19MM PS-2 3/8 CIR PRIM Y427H